# Patient Record
Sex: FEMALE | Race: BLACK OR AFRICAN AMERICAN | NOT HISPANIC OR LATINO | Employment: OTHER | URBAN - METROPOLITAN AREA
[De-identification: names, ages, dates, MRNs, and addresses within clinical notes are randomized per-mention and may not be internally consistent; named-entity substitution may affect disease eponyms.]

---

## 2017-01-18 ENCOUNTER — TELEPHONE (OUTPATIENT)
Dept: PODIATRY | Facility: CLINIC | Age: 72
End: 2017-01-18

## 2017-01-18 NOTE — TELEPHONE ENCOUNTER
----- Message from Loreta Yusuf sent at 1/18/2017 10:23 AM CST -----  Contact: self: [phone] or [phone]  Patient has an appointment on 2/7/17 but she asks if she can get in sooner because she is having a lot of pain in her foot. Please call with availability.

## 2017-01-19 PROBLEM — M10.071 ACUTE IDIOPATHIC GOUT OF RIGHT FOOT: Status: ACTIVE | Noted: 2017-01-19

## 2017-02-07 ENCOUNTER — INITIAL CONSULT (OUTPATIENT)
Dept: DERMATOLOGY | Facility: CLINIC | Age: 72
End: 2017-02-07
Payer: MEDICARE

## 2017-02-07 DIAGNOSIS — L85.3 XEROSIS CUTIS: ICD-10-CM

## 2017-02-07 DIAGNOSIS — L82.0 INFLAMED SEBORRHEIC KERATOSIS: Primary | ICD-10-CM

## 2017-02-07 PROCEDURE — 99999 PR PBB SHADOW E&M-EST. PATIENT-LVL II: CPT | Mod: PBBFAC,,, | Performed by: DERMATOLOGY

## 2017-02-07 PROCEDURE — 99202 OFFICE O/P NEW SF 15 MIN: CPT | Mod: S$PBB,,, | Performed by: DERMATOLOGY

## 2017-02-07 PROCEDURE — 99212 OFFICE O/P EST SF 10 MIN: CPT | Mod: PBBFAC,PO | Performed by: DERMATOLOGY

## 2017-02-07 NOTE — PROGRESS NOTES
Subjective:       Patient ID:  Darby Rodriguez is a 71 y.o. female who presents for   Chief Complaint   Patient presents with    Lesion     HPI Comments: Itchy lesion on right lower leg had been present for 1 month, resolved last week after using blessed oil    No phx of skin cancer  No fhx of skin cancer    C/o dry skin and wrinkling on hands, washes with hot water. Moisturizes.     Review of Systems   Skin: Negative for daily sunscreen use, activity-related sunscreen use and recent sunburn.        Objective:    Physical Exam   Skin:                 Diagram Legend     Erythematous scaling macule/papule c/w actinic keratosis       Vascular papule c/w angioma      Pigmented verrucoid papule/plaque c/w seborrheic keratosis      Yellow umbilicated papule c/w sebaceous hyperplasia      Irregularly shaped tan macule c/w lentigo     1-2 mm smooth white papules consistent with Milia      Movable subcutaneous cyst with punctum c/w epidermal inclusion cyst      Subcutaneous movable cyst c/w pilar cyst      Firm pink to brown papule c/w dermatofibroma      Pedunculated fleshy papule(s) c/w skin tag(s)      Evenly pigmented macule c/w junctional nevus     Mildly variegated pigmented, slightly irregular-bordered macule c/w mildly atypical nevus      Flesh colored to evenly pigmented papule c/w intradermal nevus       Pink pearly papule/plaque c/w basal cell carcinoma      Erythematous hyperkeratotic cursted plaque c/w SCC      Surgical scar with no sign of skin cancer recurrence      Open and closed comedones      Inflammatory papules and pustules      Verrucoid papule consistent consistent with wart     Erythematous eczematous patches and plaques     Dystrophic onycholytic nail with subungual debris c/w onychomycosis     Umbilicated papule    Erythematous-base heme-crusted tan verrucoid plaque consistent with inflamed seborrheic keratosis     Erythematous Silvery Scaling Plaque c/w Psoriasis     See annotation      Assessment  / Plan:        Inflamed seborrheic keratosis, right leg  Cryosurgery procedure note:    Verbal consent from the patient is obtained. Liquid nitrogen cryosurgery is applied to 1 lesions to produce a freeze injury. The patient is aware that blisters may form and is instructed on wound care with gentle cleansing and use of vaseline ointment to keep moist until healed. The patient is supplied a handout on cryosurgery and is instructed to call if lesions do not completely resolve. Risk of dyspigmentation discussed.       Xerosis cutis, hands  Discussed the following dry skin tips:    Avoid hot baths and showers, keep showers or baths brief (10-15 min), use a mild soap or cleanser, pat skin dry after showering and apply a moisturizer within 3 minutes of getting out of bath (cetaphil cream, ceraVe, aquaphor) and reapply moisturizers 2-4 times/day.              Return if symptoms worsen or fail to improve.

## 2017-02-22 ENCOUNTER — LAB VISIT (OUTPATIENT)
Dept: LAB | Facility: HOSPITAL | Age: 72
End: 2017-02-22
Attending: NURSE PRACTITIONER
Payer: MEDICARE

## 2017-02-22 ENCOUNTER — OFFICE VISIT (OUTPATIENT)
Dept: GASTROENTEROLOGY | Facility: CLINIC | Age: 72
End: 2017-02-22
Payer: MEDICARE

## 2017-02-22 VITALS
RESPIRATION RATE: 20 BRPM | WEIGHT: 250.69 LBS | SYSTOLIC BLOOD PRESSURE: 142 MMHG | BODY MASS INDEX: 35.1 KG/M2 | HEIGHT: 71 IN | DIASTOLIC BLOOD PRESSURE: 80 MMHG | HEART RATE: 70 BPM

## 2017-02-22 DIAGNOSIS — K64.8 INTERNAL HEMORRHOIDS WITHOUT COMPLICATION: ICD-10-CM

## 2017-02-22 DIAGNOSIS — K62.89 RECTAL PAIN: ICD-10-CM

## 2017-02-22 DIAGNOSIS — Z80.0 FAMILY HISTORY OF COLON CANCER: ICD-10-CM

## 2017-02-22 DIAGNOSIS — R10.32 LEFT LOWER QUADRANT PAIN: Primary | ICD-10-CM

## 2017-02-22 DIAGNOSIS — Z86.010 HISTORY OF COLON POLYPS: ICD-10-CM

## 2017-02-22 DIAGNOSIS — K59.09 INTERMITTENT CONSTIPATION: ICD-10-CM

## 2017-02-22 DIAGNOSIS — R10.32 LEFT LOWER QUADRANT PAIN: ICD-10-CM

## 2017-02-22 DIAGNOSIS — Z87.19 HISTORY OF IBS: ICD-10-CM

## 2017-02-22 DIAGNOSIS — Z87.19 HISTORY OF DIVERTICULOSIS: ICD-10-CM

## 2017-02-22 LAB
ALBUMIN SERPL BCP-MCNC: 3.7 G/DL
ALP SERPL-CCNC: 87 U/L
ALT SERPL W/O P-5'-P-CCNC: 16 U/L
ANION GAP SERPL CALC-SCNC: 8 MMOL/L
AST SERPL-CCNC: 39 U/L
BASOPHILS # BLD AUTO: 0 K/UL
BASOPHILS NFR BLD: 0 %
BILIRUB SERPL-MCNC: 0.5 MG/DL
BUN SERPL-MCNC: 21 MG/DL
CALCIUM SERPL-MCNC: 9.2 MG/DL
CHLORIDE SERPL-SCNC: 107 MMOL/L
CO2 SERPL-SCNC: 26 MMOL/L
CREAT SERPL-MCNC: 1.2 MG/DL
DIFFERENTIAL METHOD: ABNORMAL
EOSINOPHIL # BLD AUTO: 0 K/UL
EOSINOPHIL NFR BLD: 0.2 %
ERYTHROCYTE [DISTWIDTH] IN BLOOD BY AUTOMATED COUNT: 14.4 %
EST. GFR  (AFRICAN AMERICAN): 53.3 ML/MIN/1.73 M^2
EST. GFR  (NON AFRICAN AMERICAN): 46.2 ML/MIN/1.73 M^2
GLUCOSE SERPL-MCNC: 95 MG/DL
HCT VFR BLD AUTO: 34.8 %
HGB BLD-MCNC: 11 G/DL
LYMPHOCYTES # BLD AUTO: 0.9 K/UL
LYMPHOCYTES NFR BLD: 21.5 %
MCH RBC QN AUTO: 29.4 PG
MCHC RBC AUTO-ENTMCNC: 31.6 %
MCV RBC AUTO: 93 FL
MONOCYTES # BLD AUTO: 0.3 K/UL
MONOCYTES NFR BLD: 7.2 %
NEUTROPHILS # BLD AUTO: 3 K/UL
NEUTROPHILS NFR BLD: 70.9 %
PLATELET # BLD AUTO: 176 K/UL
PMV BLD AUTO: 10.9 FL
POTASSIUM SERPL-SCNC: 4.5 MMOL/L
PROT SERPL-MCNC: 6.9 G/DL
RBC # BLD AUTO: 3.74 M/UL
SODIUM SERPL-SCNC: 141 MMOL/L
WBC # BLD AUTO: 4.18 K/UL

## 2017-02-22 PROCEDURE — 99999 PR PBB SHADOW E&M-EST. PATIENT-LVL III: CPT | Mod: PBBFAC,,, | Performed by: NURSE PRACTITIONER

## 2017-02-22 PROCEDURE — 36415 COLL VENOUS BLD VENIPUNCTURE: CPT | Mod: PO

## 2017-02-22 PROCEDURE — 80053 COMPREHEN METABOLIC PANEL: CPT

## 2017-02-22 PROCEDURE — 99214 OFFICE O/P EST MOD 30 MIN: CPT | Mod: S$PBB,,, | Performed by: NURSE PRACTITIONER

## 2017-02-22 PROCEDURE — 85025 COMPLETE CBC W/AUTO DIFF WBC: CPT

## 2017-02-22 RX ORDER — TRAMADOL HYDROCHLORIDE 50 MG/1
50 TABLET ORAL EVERY 6 HOURS PRN
Refills: 0 | COMMUNITY
Start: 2017-02-13 | End: 2017-06-20

## 2017-02-22 RX ORDER — HYDROCORTISONE ACETATE 25 MG/1
25 SUPPOSITORY RECTAL 2 TIMES DAILY PRN
Qty: 24 SUPPOSITORY | Refills: 2 | Status: SHIPPED | OUTPATIENT
Start: 2017-02-22 | End: 2017-03-04

## 2017-02-22 RX ORDER — POLYETHYLENE GLYCOL 3350 17 G/17G
17 POWDER, FOR SOLUTION ORAL DAILY
Qty: 510 G | Refills: 2 | Status: SHIPPED | OUTPATIENT
Start: 2017-02-22 | End: 2018-08-14 | Stop reason: SDUPTHER

## 2017-02-22 RX ORDER — METHOCARBAMOL 500 MG/1
TABLET, FILM COATED ORAL
Refills: 0 | COMMUNITY
Start: 2017-02-13 | End: 2017-06-20

## 2017-02-22 NOTE — PROGRESS NOTES
Subjective:       Patient ID: Darby Rodriguez is a 71 y.o. female Body mass index is 34.96 kg/(m^2).    Chief Complaint: Abdominal Pain (for 1 month)    This patient is new to me.  Established patient of Dr. Pickett.    Abdominal Pain   This is a recurrent problem. Episode onset: started a month ago, has had similar pain in past. Episode frequency: 1-2 times a day. Duration: lasts a few seconds. The problem has been unchanged. The pain is located in the LLQ. The pain is at a severity of 7/10. The quality of the pain is sharp (soreness). The abdominal pain does not radiate. Associated symptoms include belching (not more than usual), constipation (chronic occasional; bowel movements are once every other day; increased fiber in diet or mineral oil daily), flatus (not more than usual) and nausea (mild occasional). Pertinent negatives include no anorexia, diarrhea, dysuria, fever, frequency, hematochezia, melena, vomiting or weight loss (gained weight recently). Exacerbated by: stress, bending forwarding. She has tried proton pump inhibitors and H2 blockers (bentyl 10 mg 2-3 times prn- no relief, prilosec 40 mg once daily, zantac 300 mg once daily) for the symptoms. Her past medical history is significant for GERD (controlled with zantac 300 mg once daily at night and prilosec 40 mg once daily in AM). There is no history of colon cancer, Crohn's disease, pancreatitis, PUD or ulcerative colitis.     Review of Systems   Constitutional: Negative for appetite change, chills, fatigue, fever, unexpected weight change and weight loss (gained weight recently).   HENT: Negative for sore throat and trouble swallowing.    Respiratory: Negative for cough, choking, chest tightness and shortness of breath.    Cardiovascular: Negative for chest pain and palpitations.   Gastrointestinal: Positive for abdominal pain, constipation (chronic occasional; bowel movements are once every other day; increased fiber in diet or mineral oil daily),  "flatus (not more than usual), nausea (mild occasional) and rectal pain (throbbing- constant, tried OTC hemorrhoid cream without relief; started last year; denies pain with bowel movements; denies hemorrhoids). Negative for anal bleeding, anorexia, blood in stool, diarrhea, hematochezia, melena and vomiting.   Genitourinary: Negative for difficulty urinating, dysuria, flank pain, frequency, pelvic pain and urgency.   Neurological: Negative for weakness.       Past Medical History:   Diagnosis Date    Allergy     Anemia     Arthritis     Colon polyp     Degenerative joint disease (DJD) of hip     (L); last injected 4/1/15    Diverticulosis     DJD (degenerative joint disease) of knee     left; last injected 4/1/15    GERD (gastroesophageal reflux disease)     Gout     Headache(784.0)     HTN (hypertension)     Hypothyroidism (acquired)     IBS (irritable bowel syndrome) 7/29/2013    Mixed hyperlipidemia     PONV (postoperative nausea and vomiting)     Renal insufficiency     Dr. Greene     Past Surgical History:   Procedure Laterality Date    COLONOSCOPY  2010?  [facility]    (diverticulosis?)    COLONOSCOPY  03/2015    Dr. Pickett, repeat in 5 years    COLONOSCOPY W/ POLYPECTOMY  11/12/2010  [facility]    Two 5 mm polyps in the sigmoid colon.  Diverticulosis (sigmoid, descending, and transverse colon).  Melanosis.    COLONOSCOPY W/ POLYPECTOMY  6/17/13  [facility]    One 1 mm polyp in the distal sigmoid colon.  TUBULAR ADENOMA.  Diverticulosis in the sigmoid colon.  Moderate colonic spasm consistent with IBS.  Redundant colon.  Melanosis in the colon.    ESOPHAGOGASTRODUODENOSCOPY  4/27/2012  [facility]    Normal esophagus.  Small hiatal hernia.  Antrum erythema.  REACTIVE/CHEMICAL GASTROPATHY.  NO HELICOBACTER SEEN.    ESOPHAGOSCOPY W/ DILATION  1/5/2015  [facility]    Benign-appearing esophageal stricture or "ring," dilated 54 Fr.  Reflux esophagitis. (NERD).  Hiatus hernia.  Erythematous mucosa in the antrum.  " "NONNEOPLASTIC GASTRIC MUCOSA SHOWING REACTIVE/REPARATIVE CHANGES.  CECY Test  Negative.    HYSTERECTOMY  1980    Partial    JOINT REPLACEMENT  4/15/11    (R) TKA    JOINT REPLACEMENT  8/19/09    (R)PAVAN    KNEE ARTHROSCOPY      (R) knee    RADIOFREQUENCY ABLATION Right 10/20/2014    UPPER GASTROINTESTINAL ENDOSCOPY  01/2015    Dr. Pickett     Family History   Problem Relation Age of Onset    Heart disease Sister 50     mi    Ovarian cancer Sister 53    Diabetes Mother     Colon cancer Father 65    Breast cancer Neg Hx     Crohn's disease Neg Hx     Ulcerative colitis Neg Hx      Wt Readings from Last 10 Encounters:   02/22/17 113.7 kg (250 lb 10.6 oz)   01/19/17 110.2 kg (243 lb)   12/06/16 109.3 kg (241 lb)   12/05/16 108 kg (238 lb)   11/08/16 108 kg (238 lb)   10/13/16 110.2 kg (243 lb)   08/23/16 111.7 kg (246 lb 4.1 oz)   08/17/16 109.3 kg (241 lb)   08/03/16 108.9 kg (240 lb)   07/06/16 111.1 kg (245 lb)     Lab Results   Component Value Date    TSH 0.602 12/07/2015     Reviewed prior medical records including radiology report of 8/14/14 CT abdomen/pelvis & endoscopy history (see surgical history).    3/4/15 Colonoscopy was reviewed and procedure report states:   "Impression:          - One 1 mm polyp in the cecum. Resected and                        retrieved.                       - Diverticulosis in the sigmoid colon.                       - Mild colonic spasm consistent with irritable bowel                        syndrome.                       - Diverticulosis in the descending colon, in the                        transverse colon and at the hepatic flexure.                       - Internal hemorrhoids.                       - Hemorrhagic mucosa in the proximal ascending                        colon, due to barotrauma. Biopsied.                       - The examination was otherwise normal.                       - The examined portion of the ileum was normal.  Recommendation:      - Discharge " "patient to home.                       - Await pathology results.                       - Repeat colonoscopy in 5 years for surveillance.                       - High fiber diet.                       - Use fiber, for example Citrucel, Fibercon, Konsyl                        or Metamucil.                       - Take a PROBIOTIC, such as ALIGN or CULTURELLE or                        ADAM-Q (all non-prescription), every day for a                        month.                       - Continue present medications.                       - Use Bentyl (dicyclomine) 10 mg PO QID 30 min AC.                       - Call the G.I. clinic in 2 weeks for reports (if                        you haven't heard from us sooner) [phone].                       - Return to primary care physician.                       - Return to GI clinic PRN.                       - Patient has a contact number available for                        emergencies. The signs and symptoms of potential                        delayed complications were discussed with the                        patient. Return to normal activities tomorrow.                        Written discharge instructions were provided to the                        patient.                       - Return to normal activities tomorrow.".  Biopsy results:   "1-3. FRAGMENTS OF NONNEOPLASTIC COLONIC MUCOSA WITH NO ACTIVE INFLAMMATION,  ULCERATION OR DYSPLASIA IDENTIFIED."    1/5/15 EGD was reviewed and procedure report states:   " Impression:          - Normal oropharynx.                       - Normal cricopharyngeus.                       - Benign-appearing esophageal stricture or "ring,"                        dilated. Esophagus dilated, 54 Fr.                       - Reflux esophagitis.                       - Normal esophagus otherwise.                       - Non-erosive esophageal reflux (NERD) disease.                       - Hiatus hernia.                       - Normal gastric " "fundus and gastric body.                       - Erythematous mucosa in the antrum and prepyloric                        region of the stomach. Biopsied.                       - Normal examined duodenum.  Recommendation:      - Discharge patient to home.                       - Await pathology and CLOtest results.                       - Observe patient's clinical course following                        today's procedure with therapeutic intervention.                       - Follow an antireflux regimen.                       - Continue present medications.                       - Use Prilosec (omeprazole) 40 mg PO daily.                       - Use Zantac (ranitidine) 300 mg PO daily.                       - Use sucralfate tablets 2 gram PO BID for 2 weeks.                       - Call the G.I. clinic in 2 weeks for reports (if                        you haven't heard from us sooner) [phone].                       - Return to GI clinic in 6-8 weeks. ".  Biopsy results:   "FRAGMENTS OF NONNEOPLASTIC GASTRIC MUCOSA SHOWING REACTIVE/REPARATIVE CHANGES. NO  ACUTE INFLAMMATION, EVIDENCE OF H. PYLORI OR DYSPLASIA IDENTIFIED."  Objective:      Physical Exam   Constitutional: She is oriented to person, place, and time. She appears well-developed and well-nourished. No distress.   HENT:   Mouth/Throat: Oropharynx is clear and moist and mucous membranes are normal. No oral lesions. No oropharyngeal exudate.   Eyes: Conjunctivae are normal. Pupils are equal, round, and reactive to light. No scleral icterus.   Neck: Normal range of motion. Neck supple. No tracheal deviation present.   Cardiovascular: Normal rate.    Pulmonary/Chest: Effort normal and breath sounds normal. No respiratory distress. She has no wheezes.   Abdominal: Soft. Normal appearance and bowel sounds are normal. She exhibits no distension, no abdominal bruit, no ascites and no mass. There is no hepatosplenomegaly. There is tenderness (mild) in the left " lower quadrant. There is no rigidity, no rebound, no guarding, no tenderness at McBurney's point and negative Hodgson's sign. No hernia.   Genitourinary: Rectal exam shows internal hemorrhoid (internal hemorrhoid palpated). Rectal exam shows no external hemorrhoid, no fissure, no mass, no tenderness, anal tone normal and guaiac negative stool.   Genitourinary Comments: Chaperone in room for rectal exam.   Lymphadenopathy:     She has no cervical adenopathy.   Neurological: She is alert and oriented to person, place, and time.   Skin: Skin is warm and dry. No rash noted. She is not diaphoretic. No erythema. No pallor.   Non-jaundiced   Psychiatric: She has a normal mood and affect. Her behavior is normal.   Nursing note and vitals reviewed.      Assessment:       1. Left lower quadrant pain    2. History of diverticulosis    3. History of colon polyps    4. Family history of colon cancer    5. History of IBS    6. Rectal pain    7. Intermittent constipation    8. Internal hemorrhoids without complication        Plan:       Left lower quadrant pain  -     CT Abdomen Pelvis With Contrast; Future; Expected date: 2/22/17  -     CBC auto differential; Future; Expected date: 2/22/17  -     Comprehensive metabolic panel; Future; Expected date: 2/22/17  -  START   polyethylene glycol (GLYCOLAX) 17 gram/dose powder; Take 17 g by mouth once daily.  Dispense: 510 g; Refill: 2    History of diverticulosis  -     CT Abdomen Pelvis With Contrast; Future; Expected date: 2/22/17  - discussed the diagnosis of diverticulosis and diverticulitis, to prevent diverticulitis, high fiber diet is recommended  - discussed about antibiotic treatment, patient reports she would like to wait for ct scan results to see if antibiotics are indicated  -Recommended high fiber diet after episode has completely resolved. Recommended daily exercise, adequate water intake (six 8-oz glasses of water daily), and high fiber diet. OTC fiber supplements are  recommended if diet does not reach daily fiber goal (25 grams daily), such as Metamucil, Citrucel, or FiberCon (take as directed, separate from other oral medications by >2 hours).  - Advised to avoid/minimize popcorn, corn, seeds, and nuts.    History of colon polyps & Family history of colon cancer  - next surveillance colonoscopy due 3/2020    History of IBS  -     CT Abdomen Pelvis With Contrast; Future; Expected date: 2/22/17  - discussed diagnosis with patient in depth, reviewed and gave IBS educational information in AVS, patient verbalized understanding  - recommended OTC probiotics, such as Align or Culturelle, as directed  - avoid lactose  - drink adequate water intake  -smaller, more frequent meals  -avoid trigger foods    Rectal pain & Internal hemorrhoids without complication  -     CT Abdomen Pelvis With Contrast; Future; Expected date: 2/22/17  -  START   hydrocortisone (ANUSOL-HC) 25 mg suppository; Place 1 suppository (25 mg total) rectally 2 (two) times daily as needed for Hemorrhoids.  Dispense: 24 suppository; Refill: 2  - avoid constipation and straining with bowel movements  - SITZ bath  - possible referral to general surgery/lower endoscopy if symptoms persist    Intermittent constipation  - START    polyethylene glycol (GLYCOLAX) 17 gram/dose powder; Take 17 g by mouth once daily.  Dispense: 510 g; Refill: 2  - Recommended daily exercise, adequate water intake (six 8-oz glasses of water daily), and high fiber diet. OTC fiber supplements are recommended if diet does not reach daily fiber goal (25 grams daily), such as Metamucil, Citrucel, or FiberCon (take as directed, separate from other oral medications by >2 hours).  -Recommend taking an OTC stool softener such as Colace as directed to avoid hard stools and straining with bowel movements PRN  - If no improvement, try intermittently dosed Dulcolax OTC (every 2-3 days) to facilitate bowel movements  -If still no improvement with these  measures, call/follow-up    Return in about 2 months (around 4/22/2017), or if symptoms worsen or fail to improve.    If no improvement in symptoms or symptoms worsen, call/follow-up at clinic or go to ER.

## 2017-02-22 NOTE — MR AVS SNAPSHOT
[facility]  [address]  [facility] [address]  Phone: [phone]                  Darby Rodriguez   2017 3:00 PM   Office Visit    Description:  Female : 1945   Provider:  TRINIDAD Santos   Department:  [facility]           Reason for Visit     Abdominal Pain           Diagnoses this Visit        Comments    Left lower quadrant pain    -  Primary     History of diverticulosis         History of colon polyps         Family history of colon cancer         History of IBS         Rectal pain         Intermittent constipation         Internal hemorrhoids without complication                To Do List           Future Appointments        Provider Department Dept Phone    2017 10:00 AM LAB, [facility] [phone]      Goals (5 Years of Data)     None      Follow-Up and Disposition     Return in about 2 months (around 2017), or if symptoms worsen or fail to improve.       These Medications        Disp Refills Start End    polyethylene glycol (GLYCOLAX) 17 gram/dose powder 510 g 2 2017 3/24/2017    Take 17 g by mouth once daily. - Oral    Pharmacy: [facility]/pharmacy #[id number] - [address] Ph #: [phone]       hydrocortisone (ANUSOL-HC) 25 mg suppository 24 suppository 2 2017 3/4/2017    Place 1 suppository (25 mg total) rectally 2 (two) times daily as needed for Hemorrhoids. - Rectal    Pharmacy: [facility]/pharmacy #[id number] [address] Ph #: [phone]         [facility] On [facility]     [facility] On [facility] Nurse Care Line -  Assistance  Registered nurses in the [facility] provide clinical advisement, health education, appointment booking, and other advisory services.  Call for this free service at [phone].             Medications           Message regarding Medications     Verify the  changes and/or additions to your medication regime listed below are the same as discussed with your clinician today.  If any of these changes or additions are incorrect, please notify your healthcare provider.        START taking these NEW medications        Refills    polyethylene glycol (GLYCOLAX) 17 gram/dose powder 2    Sig: Take 17 g by mouth once daily.    Class: Normal    Route: Oral    hydrocortisone (ANUSOL-HC) 25 mg suppository 2    Sig: Place 1 suppository (25 mg total) rectally 2 (two) times daily as needed for Hemorrhoids.    Class: Normal    Route: Rectal           Verify that the below list of medications is an accurate representation of the medications you are currently taking.  If none reported, the list may be blank. If incorrect, please contact your healthcare provider. Carry this list with you in case of emergency.           Current Medications     ALLERGY, CHLORPHENIRAMINE, 4 mg tablet TAKE 1 TABLET BY MOUTH EVERY FOUR TO SIX HOURS AS NEEDED.    allopurinol (ZYLOPRIM) 300 MG tablet Take 300 mg by mouth once daily.    ascorbic acid (VITAMIN C) 1000 MG tablet Take 1,000 mg by mouth once daily.     aspirin (ECOTRIN) 81 MG EC tablet Take 81 mg by mouth once a week.     atenolol (TENORMIN) 50 MG tablet Take 50 mg by mouth once daily.     atorvastatin (LIPITOR) 10 MG tablet Take one tablet by mouth once daily.    calcitRIOL (ROCALTROL) 0.25 MCG Cap Take 0.25 mcg by mouth. Monday through Friday.    calcium-vitamin D 185 MG Tab Take 185 mg by mouth once daily.      colchicine 0.6 mg tablet Take 1 tablet (0.6 mg total) by mouth 2 (two) times daily as needed.    dicyclomine (BENTYL) 10 MG capsule TAKE 1 OR 2 CAPSULES BY MOUTH UP TO FOUR TIMES DAILY USUALLY BEFORE MEALS AND AT BEDTIME TO EASE BLOATING AND GAS PAINS    ferrous sulfate 325 mg (65 mg iron) Tab Take 1 tablet by mouth. Pt takes 2 times a week    fluconazole (DIFLUCAN) 100 MG tablet TAKE 1 TABLET (100 MG TOTAL) BY MOUTH ONCE DAILY.    furosemide  "(LASIX) 40 MG tablet Take 40 mg by mouth 2 (two) times daily.    gabapentin (NEURONTIN) 600 MG tablet Take 600 mg by mouth 3 (three) times daily.     levothyroxine (SYNTHROID) 112 MCG tablet Take 1 tablet (112 mcg total) by mouth once daily.    meclizine (ANTIVERT) 25 mg tablet Take 1 tablet (25 mg total) by mouth 3 (three) times daily as needed for Dizziness or Nausea.    methocarbamol (ROBAXIN) 500 MG Tab TAKE 2 TABLETS BY MOUTH 3 TIMES DAILY FOR 10 DAYS    multivitamin (MULTIVITAMIN) per tablet Take 1 tablet by mouth once daily.      omeprazole (PRILOSEC) 40 MG capsule TAKE 1 CAPSULE (40 MG TOTAL) BY MOUTH ONCE DAILY.    oxycodone-acetaminophen (PERCOCET)  mg per tablet Take 1 tablet by mouth 3 (three) times daily as needed for Pain.    potassium chloride SA (K-DUR,KLOR-CON) 20 MEQ tablet TAKE ONE TABLET BY MOUTH TWO TIMES DAILY.    ranitidine (ZANTAC) 300 MG capsule Take 1 capsule (300 mg total) by mouth nightly. , about 1 hour before bedtime.    sertraline (ZOLOFT) 50 MG tablet Take 1 tablet (50 mg total) by mouth once daily.    tizanidine (ZANAFLEX) 4 MG tablet Take 4 mg by mouth as needed.     tramadol (ULTRAM) 50 mg tablet Take 50 mg by mouth every 6 (six) hours as needed.    valacyclovir (VALTREX) 1000 MG tablet TAKE ONE TABLET BY MOUTH THREE TIMES A DAY    hydrocortisone (ANUSOL-HC) 25 mg suppository Place 1 suppository (25 mg total) rectally 2 (two) times daily as needed for Hemorrhoids.    lorazepam (ATIVAN) 1 MG tablet Take 1 tablet (1 mg total) by mouth every 8 (eight) hours as needed for Anxiety.    polyethylene glycol (GLYCOLAX) 17 gram/dose powder Take 17 g by mouth once daily.    triamcinolone acetonide 0.1% (KENALOG) 0.1 % cream Apply topically 2 (two) times daily.           Clinical Reference Information           Your Vitals Were     BP Pulse Resp Height Weight BMI    142/80 70 20 5' 11" (1.803 m) 113.7 kg (250 lb 10.6 oz) 34.96 kg/m2      Blood Pressure          Most Recent Value    BP  " (!)  142/80      Allergies as of 2/22/2017     Penicillin G    Iodine    Meperidine    Promethazine      Immunizations Administered on Date of Encounter - 2/22/2017     None      Orders Placed During Today's Visit     Future Labs/Procedures Expected by Expires    CBC auto differential  2/22/2017 4/23/2018    Comprehensive metabolic panel  2/22/2017 4/23/2018    CT Abdomen Pelvis With Contrast  2/22/2017 2/22/2018      Instructions      Abdominal Pain    Abdominal pain is pain in the stomach or belly area. Everyone has this pain from time to time. In many cases it goes away on its own. But abdominal pain can sometimes be due to a serious problem, such as appendicitis. So its important to know when to seek help.  Causes of abdominal pain  There are many possible causes of abdominal pain. Common causes in adults include:  · Constipation, diarrhea, or gas  · Stomach acid flowing back up into the esophagus (acid reflux or heartburn)  · Severe acid reflux, called GERD (gastroesophageal reflux disease)  · A sore in the lining of the stomach or small intestine (peptic ulcer)  · Inflammation of the gallbladder, liver, or pancreas  · Gallstones or kidney stones  · Appendicitis   · Intestinal blockage   · An internal organ pushing through a muscle or other tissue (hernia)  · Urinary tract infections  · In women, menstrual cramps, fibroids, or endometriosis  · Inflammation or infection of the intestines  Diagnosing the cause of abdominal pain  Your healthcare provider will do a physical exam help find the cause of your pain. If needed, tests will be ordered. Belly pain has many possible causes. So it can be hard to find the reason for your pain. Giving details about your pain can help. Tell your provider where and when you feel the pain, and what makes it better or worse. Also let your provider know if you have other symptoms such as:  · Fever  · Tiredness  · Upset stomach (nausea)  · Vomiting  · Changes in bathroom  habits  Treating abdominal pain  Some causes of pain need emergency medical treatment right away. These include appendicitis or a bowel blockage. Other problems can be treated with rest, fluids, or medicines. Your healthcare provider can give you specific instructions for treatment or self-care based on what is causing your pain.  If you have vomiting or diarrhea, sip water or other clear fluids. When you are ready to eat solid foods again, start with small amounts of easy-to-digest, low-fat foods. These include apple sauce, toast, or crackers.   When to seek medical care  Call 911 or go to the hospital right away if you:  · Cant pass stool and are vomiting  · Are vomiting blood or have bloody diarrhea or black, tarry diarrhea  · Have chest, neck, or shoulder pain  · Feel like you might pass out  · Have pain in your shoulder blades with nausea  · Have sudden, severe belly pain  · Have new, severe pain unlike any you have felt before  · Have a belly that is rigid, hard, and tender to touch  Call your healthcare provider if you have:  · Pain for more than 5 days  · Bloating for more than 2 days  · Diarrhea for more than 5 days  · A fever of 100.4°F (38.0°C) or higher, or as directed by your provider  · Pain that gets worse  · Weight loss for no reason  · Continued lack of appetite  · Blood in your stool  How to prevent abdominal pain  Here are some tips to help prevent abdominal pain:  · Eat smaller amounts of food at one time.  · Avoid greasy, fried, or other high-fat foods.  · Avoid foods that give you gas.  · Exercise regularly.  · Drink plenty of fluids.  To help prevent GERD symptoms:  · Quit smoking.  · Reduce alcohol and certain foods that increase stomach acid.  · Avoid aspirin and over-the-counter pain and fever medicines (NSAIDS or nonsteroidal anti-inflammatory drugs), if possible  · Lose extra weight.  · Finish eating at least 2 hours before you go to bed or lie down.  · Raise the head of your bed.  Date  Last Reviewed: 7/1/2016 © 2000-2016 [employer]. [address] [place] [address], [place] [address]. All rights reserved. This information is not intended as a substitute for professional medical care. Always follow your healthcare professional's instructions.        Hemorrhoids    Hemorrhoids are swollen and inflamed veins inside the rectum and near the anus. The rectum is the last several inches of the colon. The anus is the passage between the rectum and the outside of the body.  Causes  The veins can become swollen due to increased pressure in them. This is most often caused by:  · Chronic constipation or diarrhea  · Straining when having a bowel movement  · Sitting too long on the toilet  · A low-fiber diet  · Pregnancy  Symptoms  · Bleeding from the rectum (this may be noticeable after bowel movements)  · Lump near the anus  · Itching around the anus  · Pain around the anus  There are different types of hemorrhoids. Depending on the type you have and the severity, you may be able to treat yourself at home. In some cases, a procedure may be the best treatment option. Your healthcare provider can tell you more about this, if needed.  Home care  General care  · To get relief from pain or itching, try:  ¨ Topical products. Your healthcare provider may prescribe or recommend creams, ointments, or pads that can be applied to the hemorrhoid. Use these exactly as directed.  ¨ Medicines. Your healthcare provider may recommend stool softeners, suppositories, or laxatives to help manage constipation. Use these exactly as directed.  ¨ Sitz baths. A sitz bath involves sitting in a few inches of warm bath water. Be careful not to make the water so hot that you burn yourself--test it before sitting in it. Soak for about 10 to 15 minutes a few times a day. This may help relieve pain.  Tips to help prevent hemorrhoids  · Eat more fiber. Fiber adds bulk to stool and absorbs water as it moves through your colon. This makes  stool softer and easier to pass.  ¨ Increase the fiber in your diet with more fiber-rich foods. These include fresh fruit, vegetables, and whole grains.  ¨ Take a fiber supplement or bulking agent, if advised to by your provider. These include products such as psyllium or methylcellulose.  · Drink plenty of water, if directed to by your provider. This can help keep stool soft.  · Be more active. Frequent exercise aids digestion and helps prevent constipation. It may also help make bowel movements more regular.  · Dont strain during bowel movements. This can make hemorrhoids more likely. Also, dont sit on the toilet for long periods of time.  Follow-up care  Follow up with your healthcare provider, or as advised. If a culture or imaging tests were done, you will be notified of the results when they are ready. This may take a few days or longer.  When to seek medical advice  Call your healthcare provider right away if any of these occur:  · Increased bleeding from the rectum  · Increased pain around the rectum or anus  · Weakness or dizziness  Call 911  Call 911 or return to the emergency department right away if any of these occur:  · Trouble breathing or swallowing  · Fainting or loss of consciousness  · Unusually fast heart rate  · Vomiting blood  · Large amounts of blood in stool  Date Last Reviewed: 6/22/2015 © 2000-2016 [employer]. [address] [place] [address], [place] [address]. All rights reserved. This information is not intended as a substitute for professional medical care. Always follow your healthcare professional's instructions.        Understanding Diverticulosis and Diverticulitis     Pouches or diverticula usually occur in the lower part of the colon called the sigmoid.     The colon (large intestine) is the last part of the digestive tract. It absorbs water from stool and changes it from a liquid to a solid. In certain cases, small pouches called diverticula can form in the colon wall.  This condition is called diverticulosis. The pouches can become infected. If this happens, it becomes a more serious problem called diverticulitis. These problems can be painful. But they can be managed.  Managing your condition  Diet changes or medicines may be prescribed.   If you have diverticulosis  Recommendations include:  · Diet changes are often enough to control symptoms. The main changes are adding fiber (roughage) and drinking more water. Fiber absorbs water as it travels through your colon. This helps your stool stay soft and move smoothly. Water helps this process.  · If needed, you may be told to take over-the-counter stool softeners.  · To help relieve pain, antispasmodic medicines may be prescribed.  · Watch for changes in your bowel movements. Tell the healthcare provider if you notice any changes.  · Begin an exercise program. Ask your healthcare provider how to get started.  · Get plenty of rest and sleep.   If you have diverticulitis  Treatment depends on how bad your symptoms are.  · For mild symptoms. You may be put on a liquid diet for a short time. Antibiotics are usually prescribed. If these two steps relieve your symptoms, you may then be prescribed a high-fiber diet. If you still have symptoms, your healthcare provider will discuss more treatment choices with you.  · For severe symptoms. You may need to be admitted to the hospital. There, you can be given IV antibiotics and fluids. You will also be put on a low-fiber or liquid diet. Although not common, surgery is needed in some people with severe symptoms.  [place] to colon health     Diverticulitis occurs when the pouches become infected or inflamed.     Help keep your colon healthy with a diet that includes plenty of high-fiber fruits, vegetables, and whole grains. Drink plenty of liquids like water and juice. Maintain a healthy lifestyle including regular exercise, stress management, and adequate rest and sleep.   Date Last Reviewed:  7/1/2016  © 7967-7102 [employer]. [address] [place] [address], [place] [address]. All rights reserved. This information is not intended as a substitute for professional medical care. Always follow your healthcare professional's instructions.             Language Assistance Services     ATTENTION: Language assistance services are available, free of charge. Please call [phone].      ATENCIÓN: Si habla español, tiene a ambrocio disposición servicios gratuitos de asistencia lingüística. Llame al [phone].     CHÚ Ý: N?u b?n nói Ti?ng Vi?t, có các d?ch v? h? tr? ngôn ng? mi?n phí dành cho b?n. G?i s? [phone].         [facility] complies with applicable Federal civil rights laws and does not discriminate on the basis of race, color, national origin, age, disability, or sex.

## 2017-02-22 NOTE — PATIENT INSTRUCTIONS
Abdominal Pain    Abdominal pain is pain in the stomach or belly area. Everyone has this pain from time to time. In many cases it goes away on its own. But abdominal pain can sometimes be due to a serious problem, such as appendicitis. So its important to know when to seek help.  Causes of abdominal pain  There are many possible causes of abdominal pain. Common causes in adults include:  · Constipation, diarrhea, or gas  · Stomach acid flowing back up into the esophagus (acid reflux or heartburn)  · Severe acid reflux, called GERD (gastroesophageal reflux disease)  · A sore in the lining of the stomach or small intestine (peptic ulcer)  · Inflammation of the gallbladder, liver, or pancreas  · Gallstones or kidney stones  · Appendicitis   · Intestinal blockage   · An internal organ pushing through a muscle or other tissue (hernia)  · Urinary tract infections  · In women, menstrual cramps, fibroids, or endometriosis  · Inflammation or infection of the intestines  Diagnosing the cause of abdominal pain  Your healthcare provider will do a physical exam help find the cause of your pain. If needed, tests will be ordered. Belly pain has many possible causes. So it can be hard to find the reason for your pain. Giving details about your pain can help. Tell your provider where and when you feel the pain, and what makes it better or worse. Also let your provider know if you have other symptoms such as:  · Fever  · Tiredness  · Upset stomach (nausea)  · Vomiting  · Changes in bathroom habits  Treating abdominal pain  Some causes of pain need emergency medical treatment right away. These include appendicitis or a bowel blockage. Other problems can be treated with rest, fluids, or medicines. Your healthcare provider can give you specific instructions for treatment or self-care based on what is causing your pain.  If you have vomiting or diarrhea, sip water or other clear fluids. When you are ready to eat solid foods again,  start with small amounts of easy-to-digest, low-fat foods. These include apple sauce, toast, or crackers.   When to seek medical care  Call 911 or go to the hospital right away if you:  · Cant pass stool and are vomiting  · Are vomiting blood or have bloody diarrhea or black, tarry diarrhea  · Have chest, neck, or shoulder pain  · Feel like you might pass out  · Have pain in your shoulder blades with nausea  · Have sudden, severe belly pain  · Have new, severe pain unlike any you have felt before  · Have a belly that is rigid, hard, and tender to touch  Call your healthcare provider if you have:  · Pain for more than 5 days  · Bloating for more than 2 days  · Diarrhea for more than 5 days  · A fever of 100.4°F (38.0°C) or higher, or as directed by your provider  · Pain that gets worse  · Weight loss for no reason  · Continued lack of appetite  · Blood in your stool  How to prevent abdominal pain  Here are some tips to help prevent abdominal pain:  · Eat smaller amounts of food at one time.  · Avoid greasy, fried, or other high-fat foods.  · Avoid foods that give you gas.  · Exercise regularly.  · Drink plenty of fluids.  To help prevent GERD symptoms:  · Quit smoking.  · Reduce alcohol and certain foods that increase stomach acid.  · Avoid aspirin and over-the-counter pain and fever medicines (NSAIDS or nonsteroidal anti-inflammatory drugs), if possible  · Lose extra weight.  · Finish eating at least 2 hours before you go to bed or lie down.  · Raise the head of your bed.  Date Last Reviewed: 7/1/2016  © 3139-5363 [employer]. [address] [place] [address], [place] [address]. All rights reserved. This information is not intended as a substitute for professional medical care. Always follow your healthcare professional's instructions.        Hemorrhoids    Hemorrhoids are swollen and inflamed veins inside the rectum and near the anus. The rectum is the last several inches of the colon. The anus is the passage  between the rectum and the outside of the body.  Causes  The veins can become swollen due to increased pressure in them. This is most often caused by:  · Chronic constipation or diarrhea  · Straining when having a bowel movement  · Sitting too long on the toilet  · A low-fiber diet  · Pregnancy  Symptoms  · Bleeding from the rectum (this may be noticeable after bowel movements)  · Lump near the anus  · Itching around the anus  · Pain around the anus  There are different types of hemorrhoids. Depending on the type you have and the severity, you may be able to treat yourself at home. In some cases, a procedure may be the best treatment option. Your healthcare provider can tell you more about this, if needed.  Home care  General care  · To get relief from pain or itching, try:  ¨ Topical products. Your healthcare provider may prescribe or recommend creams, ointments, or pads that can be applied to the hemorrhoid. Use these exactly as directed.  ¨ Medicines. Your healthcare provider may recommend stool softeners, suppositories, or laxatives to help manage constipation. Use these exactly as directed.  ¨ Sitz baths. A sitz bath involves sitting in a few inches of warm bath water. Be careful not to make the water so hot that you burn yourself--test it before sitting in it. Soak for about 10 to 15 minutes a few times a day. This may help relieve pain.  Tips to help prevent hemorrhoids  · Eat more fiber. Fiber adds bulk to stool and absorbs water as it moves through your colon. This makes stool softer and easier to pass.  ¨ Increase the fiber in your diet with more fiber-rich foods. These include fresh fruit, vegetables, and whole grains.  ¨ Take a fiber supplement or bulking agent, if advised to by your provider. These include products such as psyllium or methylcellulose.  · Drink plenty of water, if directed to by your provider. This can help keep stool soft.  · Be more active. Frequent exercise aids digestion and helps  prevent constipation. It may also help make bowel movements more regular.  · Dont strain during bowel movements. This can make hemorrhoids more likely. Also, dont sit on the toilet for long periods of time.  Follow-up care  Follow up with your healthcare provider, or as advised. If a culture or imaging tests were done, you will be notified of the results when they are ready. This may take a few days or longer.  When to seek medical advice  Call your healthcare provider right away if any of these occur:  · Increased bleeding from the rectum  · Increased pain around the rectum or anus  · Weakness or dizziness  Call 911  Call 911 or return to the emergency department right away if any of these occur:  · Trouble breathing or swallowing  · Fainting or loss of consciousness  · Unusually fast heart rate  · Vomiting blood  · Large amounts of blood in stool  Date Last Reviewed: 6/22/2015  © 5943-9608 [employer]. [address] [place] [address]. All rights reserved. This information is not intended as a substitute for professional medical care. Always follow your healthcare professional's instructions.        Understanding Diverticulosis and Diverticulitis     Pouches or diverticula usually occur in the lower part of the colon called the sigmoid.     The colon (large intestine) is the last part of the digestive tract. It absorbs water from stool and changes it from a liquid to a solid. In certain cases, small pouches called diverticula can form in the colon wall. This condition is called diverticulosis. The pouches can become infected. If this happens, it becomes a more serious problem called diverticulitis. These problems can be painful. But they can be managed.  Managing your condition  Diet changes or medicines may be prescribed.   If you have diverticulosis  Recommendations include:  · Diet changes are often enough to control symptoms. The main changes are adding fiber (roughage) and drinking  more water. Fiber absorbs water as it travels through your colon. This helps your stool stay soft and move smoothly. Water helps this process.  · If needed, you may be told to take over-the-counter stool softeners.  · To help relieve pain, antispasmodic medicines may be prescribed.  · Watch for changes in your bowel movements. Tell the healthcare provider if you notice any changes.  · Begin an exercise program. Ask your healthcare provider how to get started.  · Get plenty of rest and sleep.   If you have diverticulitis  Treatment depends on how bad your symptoms are.  · For mild symptoms. You may be put on a liquid diet for a short time. Antibiotics are usually prescribed. If these two steps relieve your symptoms, you may then be prescribed a high-fiber diet. If you still have symptoms, your healthcare provider will discuss more treatment choices with you.  · For severe symptoms. You may need to be admitted to the hospital. There, you can be given IV antibiotics and fluids. You will also be put on a low-fiber or liquid diet. Although not common, surgery is needed in some people with severe symptoms.  [place] to colon health     Diverticulitis occurs when the pouches become infected or inflamed.     Help keep your colon healthy with a diet that includes plenty of high-fiber fruits, vegetables, and whole grains. Drink plenty of liquids like water and juice. Maintain a healthy lifestyle including regular exercise, stress management, and adequate rest and sleep.   Date Last Reviewed: 7/1/2016  © 7049-4988 [employer]. [address] [place] [address], [place] [address]. All rights reserved. This information is not intended as a substitute for professional medical care. Always follow your healthcare professional's instructions.

## 2017-03-01 ENCOUNTER — HOSPITAL ENCOUNTER (OUTPATIENT)
Dept: RADIOLOGY | Facility: HOSPITAL | Age: 72
Discharge: HOME | End: 2017-03-01
Attending: NURSE PRACTITIONER
Payer: MEDICARE

## 2017-03-01 DIAGNOSIS — R10.32 LEFT LOWER QUADRANT PAIN: ICD-10-CM

## 2017-03-01 DIAGNOSIS — Z87.19 HISTORY OF DIVERTICULOSIS: ICD-10-CM

## 2017-03-01 DIAGNOSIS — K62.89 RECTAL PAIN: ICD-10-CM

## 2017-03-01 DIAGNOSIS — Z87.19 HISTORY OF IBS: ICD-10-CM

## 2017-03-01 PROCEDURE — 74176 CT ABD & PELVIS W/O CONTRAST: CPT | Mod: TC,PO

## 2017-03-01 PROCEDURE — 74176 CT ABD & PELVIS W/O CONTRAST: CPT | Mod: 26,,, | Performed by: RADIOLOGY

## 2017-03-01 PROCEDURE — 25500020 PHARM REV CODE 255: Mod: PO | Performed by: NURSE PRACTITIONER

## 2017-03-01 RX ADMIN — Medication 900 ML: at 12:03

## 2017-03-02 ENCOUNTER — TELEPHONE (OUTPATIENT)
Dept: GASTROENTEROLOGY | Facility: CLINIC | Age: 72
End: 2017-03-02

## 2017-03-02 DIAGNOSIS — R93.3 ABNORMAL CT SCAN, ESOPHAGUS: Primary | ICD-10-CM

## 2017-03-02 NOTE — TELEPHONE ENCOUNTER
Please call to inform & review the results with the patient-  Ct scan showed possible esophageal diverticulum or hiatal hernia (hiatal hernia seen on 1/2015 EGD). Can do esophagram to further evaluate finding.  Small umbilical hernia noted: if it becomes painful or if it does not reduce patient needs to see a general surgeon or go to the ER.  Diverticulosis without diverticulitis (no signs of infection or inflammation). Recommend high fiber diet.  degenerative changes of the spine and Other findings are unchanged since previous imaging from 8/14/2014.  Continue previous recommendations. If symptoms persist, follow-up for continued evaluation and management and to schedule colonoscopy.    Thanks,  Lani SIGALA

## 2017-03-07 ENCOUNTER — OFFICE VISIT (OUTPATIENT)
Dept: PODIATRY | Facility: CLINIC | Age: 72
End: 2017-03-07
Payer: MEDICARE

## 2017-03-07 VITALS — BODY MASS INDEX: 34.75 KG/M2 | WEIGHT: 248.25 LBS | HEIGHT: 71 IN

## 2017-03-07 DIAGNOSIS — B35.1 ONYCHOMYCOSIS DUE TO DERMATOPHYTE: ICD-10-CM

## 2017-03-07 DIAGNOSIS — I87.2 VENOUS INSUFFICIENCY: ICD-10-CM

## 2017-03-07 DIAGNOSIS — G60.9 IDIOPATHIC PERIPHERAL NEUROPATHY: Primary | ICD-10-CM

## 2017-03-07 DIAGNOSIS — S99.921A PLANTAR PLATE INJURY, RIGHT, INITIAL ENCOUNTER: ICD-10-CM

## 2017-03-07 DIAGNOSIS — L84 CORN OR CALLUS: ICD-10-CM

## 2017-03-07 PROCEDURE — 99213 OFFICE O/P EST LOW 20 MIN: CPT | Mod: S$PBB,25,,

## 2017-03-07 PROCEDURE — 11721 DEBRIDE NAIL 6 OR MORE: CPT | Mod: 59,Q9,S$PBB,

## 2017-03-07 PROCEDURE — 99214 OFFICE O/P EST MOD 30 MIN: CPT | Mod: PBBFAC,PO

## 2017-03-07 PROCEDURE — 11721 DEBRIDE NAIL 6 OR MORE: CPT | Mod: Q9,PBBFAC,PO

## 2017-03-07 PROCEDURE — 11057 PARNG/CUTG B9 HYPRKR LES >4: CPT | Mod: Q9,PBBFAC,PO

## 2017-03-07 PROCEDURE — 11057 PARNG/CUTG B9 HYPRKR LES >4: CPT | Mod: Q9,S$PBB,,

## 2017-03-07 PROCEDURE — 99999 PR PBB SHADOW E&M-EST. PATIENT-LVL IV: CPT | Mod: PBBFAC,,,

## 2017-03-07 NOTE — MR AVS SNAPSHOT
[facility]  [address] [facility] [address]  [place] [address]  Phone: [phone]                  Darby Rodriguez   3/7/2017 10:45 AM   Office Visit    Description:  Female : 1945   Provider:  Kenyon Jain DPM   Department:  [facility]           Reason for Visit     Diabetic Foot Exam     Callouses                To Do List           Future Appointments        Provider Department Dept Phone    2017 11:00 AM Kenyon Jain DPM [facility] [phone]    2017 10:00 AM LAB, [facility] [phone]      Goals (5 Years of Data)     None      [facility]     [facility] On Call Nurse Care Line -  Assistance  Registered nurses in the [facility] On Call Center provide clinical advisement, health education, appointment booking, and other advisory services.  Call for this free service at [phone].             Medications           Message regarding Medications     Verify the changes and/or additions to your medication regime listed below are the same as discussed with your clinician today.  If any of these changes or additions are incorrect, please notify your healthcare provider.             Verify that the below list of medications is an accurate representation of the medications you are currently taking.  If none reported, the list may be blank. If incorrect, please contact your healthcare provider. Carry this list with you in case of emergency.           Current Medications     ALLERGY, CHLORPHENIRAMINE, 4 mg tablet TAKE 1 TABLET BY MOUTH EVERY FOUR TO SIX HOURS AS NEEDED.    allopurinol (ZYLOPRIM) 300 MG tablet Take 300 mg by mouth once daily.    ascorbic acid (VITAMIN C) 1000 MG tablet Take 1,000 mg by mouth once daily.     aspirin (ECOTRIN) 81 MG EC tablet Take 81 mg by mouth once a week.     atenolol (TENORMIN) 50 MG tablet Take 50 mg by mouth once daily.     atorvastatin (LIPITOR) 10 MG tablet Take one tablet by mouth  once daily.    calcitRIOL (ROCALTROL) 0.25 MCG Cap Take 0.25 mcg by mouth. Monday through Friday.    calcium-vitamin D 185 MG Tab Take 185 mg by mouth once daily.      colchicine 0.6 mg tablet Take 1 tablet (0.6 mg total) by mouth 2 (two) times daily as needed.    dicyclomine (BENTYL) 10 MG capsule TAKE 1 OR 2 CAPSULES BY MOUTH UP TO FOUR TIMES DAILY USUALLY BEFORE MEALS AND AT BEDTIME TO EASE BLOATING AND GAS PAINS    ferrous sulfate 325 mg (65 mg iron) Tab Take 1 tablet by mouth. Pt takes 2 times a week    furosemide (LASIX) 40 MG tablet Take 40 mg by mouth 2 (two) times daily.    gabapentin (NEURONTIN) 600 MG tablet Take 600 mg by mouth 3 (three) times daily.     levothyroxine (SYNTHROID) 112 MCG tablet Take 1 tablet (112 mcg total) by mouth once daily.    meclizine (ANTIVERT) 25 mg tablet Take 1 tablet (25 mg total) by mouth 3 (three) times daily as needed for Dizziness or Nausea.    methocarbamol (ROBAXIN) 500 MG Tab TAKE 2 TABLETS BY MOUTH 3 TIMES DAILY FOR 10 DAYS    multivitamin (MULTIVITAMIN) per tablet Take 1 tablet by mouth once daily.      omeprazole (PRILOSEC) 40 MG capsule TAKE 1 CAPSULE (40 MG TOTAL) BY MOUTH ONCE DAILY.    oxycodone-acetaminophen (PERCOCET)  mg per tablet Take 1 tablet by mouth 3 (three) times daily as needed for Pain.    polyethylene glycol (GLYCOLAX) 17 gram/dose powder Take 17 g by mouth once daily.    potassium chloride SA (K-DUR,KLOR-CON) 20 MEQ tablet TAKE ONE TABLET BY MOUTH TWO TIMES DAILY.    ranitidine (ZANTAC) 300 MG capsule Take 1 capsule (300 mg total) by mouth nightly. , about 1 hour before bedtime.    sertraline (ZOLOFT) 50 MG tablet Take 1 tablet (50 mg total) by mouth once daily.    tizanidine (ZANAFLEX) 4 MG tablet Take 4 mg by mouth as needed.     valacyclovir (VALTREX) 1000 MG tablet TAKE ONE TABLET BY MOUTH THREE TIMES A DAY    fluconazole (DIFLUCAN) 100 MG tablet TAKE 1 TABLET (100 MG TOTAL) BY MOUTH ONCE DAILY.    lorazepam (ATIVAN) 1 MG tablet Take 1  "tablet (1 mg total) by mouth every 8 (eight) hours as needed for Anxiety.    oxycodone-acetaminophen (PERCOCET)  mg per tablet Take 1 tablet by mouth every 12 (twelve) hours as needed for Pain.    tramadol (ULTRAM) 50 mg tablet Take 50 mg by mouth every 6 (six) hours as needed.    triamcinolone acetonide 0.1% (KENALOG) 0.1 % cream Apply topically 2 (two) times daily.           Clinical Reference Information           Your Vitals Were     Height Weight BMI          5' 11" (1.803 m) 112.6 kg (248 lb 3.8 oz) 34.62 kg/m2        Allergies as of 3/7/2017     Penicillin G    Iodine    Meperidine    Promethazine      Immunizations Administered on Date of Encounter - 3/7/2017     None      Language Assistance Services     ATTENTION: Language assistance services are available, free of charge. Please call [phone].      ATENCIÓN: Si habla maria l, tiene a ambrocio disposición servicios gratuitos de asistencia lingüística. Llame al [phone].     LEXA Ý: N?u b?n nói Ti?ng Vi?t, có các d?ch v? h? tr? ngôn ng? mi?n phí angelh cho b?n. G?i s? [phone].         [facility] complies with applicable Federal civil rights laws and does not discriminate on the basis of race, color, national origin, age, disability, or sex.        "

## 2017-03-08 PROBLEM — B35.1 ONYCHOMYCOSIS DUE TO DERMATOPHYTE: Status: ACTIVE | Noted: 2017-03-08

## 2017-03-08 PROBLEM — G60.9 IDIOPATHIC PERIPHERAL NEUROPATHY: Status: ACTIVE | Noted: 2017-03-08

## 2017-03-08 PROBLEM — L84 CORN OR CALLUS: Status: ACTIVE | Noted: 2017-03-08

## 2017-03-08 PROBLEM — I87.2 VENOUS INSUFFICIENCY: Status: ACTIVE | Noted: 2017-03-08

## 2017-03-08 NOTE — PROGRESS NOTES
Chief Complaint   Patient presents with    Diabetic Foot Exam     Dr RUSTY Frost 1/19/17;  HgbA1c:  Jan 2017:  ??    Callouses     arley         History of present illness:Patient presents to clinic fot at risk footcare. She has a new complaint of right 2nd toe tenderness plantarly with dorsal contracture.She has very painful thickened yellow discolored toenails and she is having issues cutting her painful calluses. She has a history of venous insufficiency. She is also reporting burning and tingling in her toes but it is much worse lately.     ROS:  Constitution: Negative for chills, fever, weakness and malaise/fatigue.   HEENT: Negative for headaches.   Cardiovascular: Negative for chest pain and claudication.   Respiratory: Negative for cough and shortness of breath.   Musculoskeletal: Negative for muscle cramps and muscle weakness.   Gastrointestinal: Negative for nausea and vomiting.   Neurological: + for numbness and paresthesias.     Constitutional:  Patient is oriented to person, place, and time. Vital signs are normal.  Appears well-developed and well-nourished.     Vascular:  Dorsalis pedis pulses are 2+ on the right side, and 2+ on the left side.   Posterior tibial pulses are 2+ on the right side, and 2+ on the left side.   + digital hair growth, capillary fill time to all toes <3 seconds  Bilateral foot and ankle swelling worse on the right. It is pitting +2/7    Skin/Dermatological:  Skin is warm and intact.  No cyanosis or clubbing.  No rashes noted.  No open wounds.     Nails thickened, yellow discolred to base, multiple plantar met head calluses x 4 +  Toenails are thickened, yellow and discolored with subungual debris.      Musculoskeletal:      Hallux abductovalgus with reducible hammertoe deformities.  Normal range of motion of midtarsal, subtalar joints.  No restriction of ankle joint dorsiflexion or plantarflexion.  No deformity, tenderness or crepitus. No assymmetries. + lachmans's test with dorsal  contracture right 2nd toe       Neurological:  + deficits to sharp/dull, light touch or vibratory sensation.     Assessment/Plan:  Onychomycosis, callosities, venous insufficiency, peripheral neuropathy, right 2nd toe plantar plate injury: At risk patient. We discussed proper foot care. With patient permission , the toenails were debrided to the base. Patient tolerated well. The calluses were pared x4+ with a 15 blade. She tolerated well. Continue Kerydin topically at night. Splint toe in PF position, consider plantar plate repair. Return to clinic 3 months

## 2017-04-05 RX ORDER — DICYCLOMINE HYDROCHLORIDE 10 MG/1
CAPSULE ORAL
Qty: 120 CAPSULE | Refills: 10 | Status: SHIPPED | OUTPATIENT
Start: 2017-04-05 | End: 2018-05-02 | Stop reason: SDUPTHER

## 2017-04-17 ENCOUNTER — TELEPHONE (OUTPATIENT)
Dept: OTOLARYNGOLOGY | Facility: CLINIC | Age: 72
End: 2017-04-17

## 2017-04-17 NOTE — TELEPHONE ENCOUNTER
----- Message from Lara Knowles sent at 4/5/2017 11:55 AM CDT -----  Contact: self  Patient [phone] is calling for her CT scan results from 03 01 17 at [facility] in [place]/please call

## 2017-05-16 PROBLEM — G56.03 BILATERAL CARPAL TUNNEL SYNDROME: Status: ACTIVE | Noted: 2017-05-16

## 2017-06-23 ENCOUNTER — TELEPHONE (OUTPATIENT)
Dept: CARDIOLOGY | Facility: CLINIC | Age: 72
End: 2017-06-23

## 2017-06-23 NOTE — TELEPHONE ENCOUNTER
----- Message from Sarah Soliz sent at 6/23/2017  4:30 PM CDT -----  Contact: Patient  Patient called back with additional questions please call back at [phone] to advise. Thanks,

## 2017-07-05 PROBLEM — M17.12 ARTHRITIS OF LEFT KNEE: Status: ACTIVE | Noted: 2017-07-05

## 2017-07-11 ENCOUNTER — OFFICE VISIT (OUTPATIENT)
Dept: PODIATRY | Facility: CLINIC | Age: 72
End: 2017-07-11
Payer: MEDICARE

## 2017-07-11 VITALS — WEIGHT: 240.31 LBS | BODY MASS INDEX: 33.64 KG/M2 | HEIGHT: 71 IN

## 2017-07-11 DIAGNOSIS — I87.2 VENOUS INSUFFICIENCY: ICD-10-CM

## 2017-07-11 DIAGNOSIS — G60.9 IDIOPATHIC PERIPHERAL NEUROPATHY: Primary | ICD-10-CM

## 2017-07-11 DIAGNOSIS — B35.1 ONYCHOMYCOSIS DUE TO DERMATOPHYTE: ICD-10-CM

## 2017-07-11 DIAGNOSIS — L84 CORN OR CALLUS: ICD-10-CM

## 2017-07-11 PROCEDURE — 11721 DEBRIDE NAIL 6 OR MORE: CPT | Mod: 59,Q9,S$PBB,

## 2017-07-11 PROCEDURE — 11057 PARNG/CUTG B9 HYPRKR LES >4: CPT | Mod: Q9,S$PBB,,

## 2017-07-11 PROCEDURE — 99213 OFFICE O/P EST LOW 20 MIN: CPT | Mod: PBBFAC,PO

## 2017-07-11 PROCEDURE — 11721 DEBRIDE NAIL 6 OR MORE: CPT | Mod: Q9,PBBFAC,PO

## 2017-07-11 PROCEDURE — 11057 PARNG/CUTG B9 HYPRKR LES >4: CPT | Mod: Q9,PBBFAC,PO

## 2017-07-11 PROCEDURE — 99499 UNLISTED E&M SERVICE: CPT | Mod: S$PBB,,,

## 2017-07-11 PROCEDURE — 99999 PR PBB SHADOW E&M-EST. PATIENT-LVL III: CPT | Mod: PBBFAC,,,

## 2017-07-11 NOTE — PROGRESS NOTES
Chief Complaint   Patient presents with    Diabetic Foot Exam     PCP:  Dr Frost 7/5/17; HgbA1c: March 2017 ?    Callouses    Nail Care         History of present illness:Patient presents to clinic fot at risk footcare. She has a new complaint of right 2nd toe tenderness plantarly with dorsal contracture.She has very painful thickened yellow discolored toenails and she is having issues cutting her painful calluses. She has a history of venous insufficiency. She also has a history of peripheral neuropathy.     ROS:  Constitution: Negative for chills, fever, weakness and malaise/fatigue.   HEENT: Negative for headaches.   Cardiovascular: Negative for chest pain and claudication.   Respiratory: Negative for cough and shortness of breath.   Musculoskeletal: Negative for muscle cramps and muscle weakness.   Gastrointestinal: Negative for nausea and vomiting.   Neurological: + for numbness and paresthesias.     Constitutional:  Patient is oriented to person, place, and time. Vital signs are normal.  Appears well-developed and well-nourished.     Vascular:  Dorsalis pedis pulses are 2+ on the right side, and 2+ on the left side.   Posterior tibial pulses are 2+ on the right side, and 2+ on the left side.   + digital hair growth, capillary fill time to all toes <3 seconds  Bilateral foot and ankle swelling worse on the right. It is pitting +2/7    Skin/Dermatological:  Skin is warm and intact.  No cyanosis or clubbing.  No rashes noted.  No open wounds.     Nails thickened, yellow discolred to base, multiple plantar met head calluses x 4 +  Toenails are thickened, yellow and discolored with subungual debris.      Musculoskeletal:      Hallux abductovalgus with reducible hammertoe deformities.  Normal range of motion of midtarsal, subtalar joints.  No restriction of ankle joint dorsiflexion or plantarflexion.  No deformity, tenderness or crepitus. No assymmetries. + lachmans's test with dorsal contracture right 2nd toe        Neurological:  + deficits to sharp/dull, light touch or vibratory sensation.     Assessment/Plan:  Onychomycosis, callosities, venous insufficiency, peripheral neuropathy, right 2nd toe plantar plate injury: At risk patient. We discussed proper foot care. With patient permission , the toenails were debrided to the base. Patient tolerated well. The calluses were pared x4+ with a 15 blade. She tolerated well. Continue Kerydin topically at night. Splint toe in PF position, consider plantar plate repair. Return to clinic 3 months.

## 2017-09-29 ENCOUNTER — LAB VISIT (OUTPATIENT)
Dept: LAB | Facility: HOSPITAL | Age: 72
End: 2017-09-29
Attending: INTERNAL MEDICINE
Payer: MEDICARE

## 2017-09-29 DIAGNOSIS — D63.8 ANEMIA OF OTHER CHRONIC DISEASE: ICD-10-CM

## 2017-09-29 PROBLEM — D63.1 ANEMIA IN STAGE 2 CHRONIC KIDNEY DISEASE: Status: ACTIVE | Noted: 2017-09-29

## 2017-09-29 PROBLEM — N18.2 ANEMIA IN STAGE 2 CHRONIC KIDNEY DISEASE: Status: ACTIVE | Noted: 2017-09-29

## 2017-09-29 LAB
ALBUMIN SERPL BCP-MCNC: 4.2 G/DL
ALP SERPL-CCNC: 117 U/L
ALT SERPL W/O P-5'-P-CCNC: 34 U/L
ANION GAP SERPL CALC-SCNC: 9 MMOL/L
AST SERPL-CCNC: 28 U/L
BASOPHILS # BLD AUTO: 0.01 K/UL
BASOPHILS NFR BLD: 0.2 %
BILIRUB SERPL-MCNC: 0.3 MG/DL
BUN SERPL-MCNC: 25 MG/DL
CALCIUM SERPL-MCNC: 10 MG/DL
CHLORIDE SERPL-SCNC: 102 MMOL/L
CO2 SERPL-SCNC: 28 MMOL/L
CREAT SERPL-MCNC: 1.26 MG/DL
DIFFERENTIAL METHOD: ABNORMAL
EOSINOPHIL # BLD AUTO: 0 K/UL
EOSINOPHIL NFR BLD: 0.5 %
ERYTHROCYTE [DISTWIDTH] IN BLOOD BY AUTOMATED COUNT: 13.5 %
EST. GFR  (AFRICAN AMERICAN): 50 ML/MIN/1.73 M^2
EST. GFR  (NON AFRICAN AMERICAN): 43 ML/MIN/1.73 M^2
GLUCOSE SERPL-MCNC: 99 MG/DL
HCT VFR BLD AUTO: 34.7 %
HGB BLD-MCNC: 11 G/DL
LYMPHOCYTES # BLD AUTO: 0.9 K/UL
LYMPHOCYTES NFR BLD: 21.3 %
MCH RBC QN AUTO: 29.2 PG
MCHC RBC AUTO-ENTMCNC: 31.7 G/DL
MCV RBC AUTO: 92 FL
MONOCYTES # BLD AUTO: 0.4 K/UL
MONOCYTES NFR BLD: 9.9 %
NEUTROPHILS # BLD AUTO: 2.8 K/UL
NEUTROPHILS NFR BLD: 68.1 %
NRBC BLD-RTO: 0 /100 WBC
PLATELET # BLD AUTO: 163 K/UL
PMV BLD AUTO: 10.4 FL
POTASSIUM SERPL-SCNC: 5.4 MMOL/L
PROT SERPL-MCNC: 7 G/DL
RBC # BLD AUTO: 3.77 M/UL
SODIUM SERPL-SCNC: 139 MMOL/L
WBC # BLD AUTO: 4.13 K/UL

## 2017-09-29 PROCEDURE — 80053 COMPREHEN METABOLIC PANEL: CPT

## 2017-09-29 PROCEDURE — 36415 COLL VENOUS BLD VENIPUNCTURE: CPT | Mod: PN

## 2017-09-29 PROCEDURE — 85025 COMPLETE CBC W/AUTO DIFF WBC: CPT | Mod: PN

## 2017-09-29 PROCEDURE — 80053 COMPREHEN METABOLIC PANEL: CPT | Mod: PN

## 2017-09-29 PROCEDURE — 85025 COMPLETE CBC W/AUTO DIFF WBC: CPT

## 2017-10-17 ENCOUNTER — OFFICE VISIT (OUTPATIENT)
Dept: PODIATRY | Facility: CLINIC | Age: 72
End: 2017-10-17
Payer: MEDICARE

## 2017-10-17 VITALS — WEIGHT: 238.75 LBS | HEIGHT: 71 IN | BODY MASS INDEX: 33.43 KG/M2

## 2017-10-17 DIAGNOSIS — G60.9 IDIOPATHIC PERIPHERAL NEUROPATHY: Primary | ICD-10-CM

## 2017-10-17 DIAGNOSIS — M20.41 HAMMER TOES OF BOTH FEET: ICD-10-CM

## 2017-10-17 DIAGNOSIS — M20.42 HAMMER TOES OF BOTH FEET: ICD-10-CM

## 2017-10-17 DIAGNOSIS — B35.1 ONYCHOMYCOSIS DUE TO DERMATOPHYTE: ICD-10-CM

## 2017-10-17 DIAGNOSIS — I87.2 VENOUS (PERIPHERAL) INSUFFICIENCY: ICD-10-CM

## 2017-10-17 DIAGNOSIS — L84 CORN OR CALLUS: ICD-10-CM

## 2017-10-17 PROCEDURE — 99999 PR PBB SHADOW E&M-EST. PATIENT-LVL III: CPT | Mod: PBBFAC,,, | Performed by: PODIATRIST

## 2017-10-17 PROCEDURE — 11057 PARNG/CUTG B9 HYPRKR LES >4: CPT | Mod: Q9,S$PBB,, | Performed by: PODIATRIST

## 2017-10-17 PROCEDURE — 11721 DEBRIDE NAIL 6 OR MORE: CPT | Mod: PBBFAC,59,PO | Performed by: PODIATRIST

## 2017-10-17 PROCEDURE — 99499 UNLISTED E&M SERVICE: CPT | Mod: S$PBB,,, | Performed by: PODIATRIST

## 2017-10-17 PROCEDURE — 99213 OFFICE O/P EST LOW 20 MIN: CPT | Mod: PBBFAC,PO | Performed by: PODIATRIST

## 2017-10-17 PROCEDURE — 11721 DEBRIDE NAIL 6 OR MORE: CPT | Mod: 59,Q9,S$PBB, | Performed by: PODIATRIST

## 2017-10-17 PROCEDURE — 11057 PARNG/CUTG B9 HYPRKR LES >4: CPT | Mod: PBBFAC,PO | Performed by: PODIATRIST

## 2017-10-18 NOTE — PROGRESS NOTES
Subjective:      Patient ID: Darby Rodriguez is a 72 y.o. female.    Chief Complaint: Peripheral Vascular Disease ([place] 7/5/17) and Nail Problem (b/l great toe feels sore ingrown feeling)    Darby is a 72 y.o. female who presents to the clinic for evaluation and treatment of high risk feet. Darby has a past medical history of Allergy; Anemia; Arthritis; Cervical disc disease; Colon polyp; Degenerative joint disease (DJD) of hip; Diabetes mellitus type I; Diverticulosis; DJD (degenerative joint disease) of knee; Encounter for blood transfusion; GERD (gastroesophageal reflux disease); Gout; Headache(784.0); HTN (hypertension); Hypothyroidism (acquired); IBS (irritable bowel syndrome) (7/29/2013); Lumbar disc disease; Mixed hyperlipidemia; PONV (postoperative nausea and vomiting); Renal insufficiency; and Sleep apnea. The patient's chief complaint is long, thick toenails.  States that the Rt. Hallux nail plate feels as though it is significantly ingrown along both borders.  Describes pain from the nail as throbbing and rates as a 7/10.  States symptoms are exacerbated with wearing closed toe shoes.  Symptoms are alleviated with doffing of shoe gear.  Has not attempted to self treat.  Denies any additional pedal complaints.   This patient has documented high risk feet requiring routine maintenance secondary to peripheral neuropathy.    PCP: Jose Frost MD    Date Last Seen by PCP: 7/5/17    Hemoglobin A1C   Date Value Ref Range Status   08/07/2014 5.7 4.5 - 6.2 % Final       Past Medical History:   Diagnosis Date    Allergy     Anemia     Arthritis     Cervical disc disease     Colon polyp     Degenerative joint disease (DJD) of hip     (L); last injected 4/1/15    Diabetes mellitus type I     Diverticulosis     DJD (degenerative joint disease) of knee     left; last injected 4/1/15    Encounter for blood transfusion     GERD (gastroesophageal reflux disease)     Gout     Headache(784.0)     HTN  "(hypertension)     Hypothyroidism (acquired)     IBS (irritable bowel syndrome) 7/29/2013    Lumbar disc disease     Mixed hyperlipidemia     PONV (postoperative nausea and vomiting)     Renal insufficiency     Dr. Grenee    Sleep apnea        Past Surgical History:   Procedure Laterality Date    COLONOSCOPY  2010?  [facility]    (diverticulosis?)    COLONOSCOPY  03/2015    Dr. Pickett, repeat in 5 years    COLONOSCOPY W/ POLYPECTOMY  11/12/2010  [facility]    Two 5 mm polyps in the sigmoid colon.  Diverticulosis (sigmoid, descending, and transverse colon).  Melanosis.    COLONOSCOPY W/ POLYPECTOMY  6/17/13  [facility]    One 1 mm polyp in the distal sigmoid colon.  TUBULAR ADENOMA.  Diverticulosis in the sigmoid colon.  Moderate colonic spasm consistent with IBS.  Redundant colon.  Melanosis in the colon.    ESOPHAGOGASTRODUODENOSCOPY  4/27/2012  [facility]    Normal esophagus.  Small hiatal hernia.  Antrum erythema.  REACTIVE/CHEMICAL GASTROPATHY.  NO HELICOBACTER SEEN.    ESOPHAGOSCOPY W/ DILATION  1/5/2015  [facility]    Benign-appearing esophageal stricture or "ring," dilated 54 Fr.  Reflux esophagitis. (NERD).  Hiatus hernia.  Erythematous mucosa in the antrum.  NONNEOPLASTIC GASTRIC MUCOSA SHOWING REACTIVE/REPARATIVE CHANGES.  CECY Test  Negative.    HYSTERECTOMY  1980    Partial    JOINT REPLACEMENT  4/15/11    (R) TKA    JOINT REPLACEMENT  8/19/09    (R)PAVAN    KNEE ARTHROSCOPY      (R) knee    RADIOFREQUENCY ABLATION Right 10/20/2014    UPPER GASTROINTESTINAL ENDOSCOPY  01/2015    Dr. Pickett       Family History   Problem Relation Age of Onset    Heart disease Sister 50     mi    Ovarian cancer Sister 53    Diabetes Mother     Colon cancer Father 65    Breast cancer Neg Hx     Crohn's disease Neg Hx     Ulcerative colitis Neg Hx        Social History     Social History    Marital status:      Spouse name: N/A    Number of children: N/A    Years of education: N/A     Social History Main Topics    " Smoking status: Never Smoker    Smokeless tobacco: Never Used    Alcohol use No    Drug use: No    Sexual activity: Not Asked     Other Topics Concern    None     Social History Narrative    None       Current Outpatient Prescriptions   Medication Sig Dispense Refill    ALLERGY, CHLORPHENIRAMINE, 4 mg tablet TAKE 1 TABLET BY MOUTH EVERY FOUR TO SIX HOURS AS NEEDED. 30 tablet PRN    allopurinol (ZYLOPRIM) 300 MG tablet Take 300 mg by mouth once daily.      ascorbic acid (VITAMIN C) 1000 MG tablet Take 1,000 mg by mouth once daily.       aspirin (ECOTRIN) 81 MG EC tablet Take 81 mg by mouth once a week.       atenolol (TENORMIN) 50 MG tablet Take 100 mg by mouth once daily.   4    atorvastatin (LIPITOR) 10 MG tablet TAKE ONE TABLET BY MOUTH ONCE DAILY. 90 tablet 3    calcitRIOL (ROCALTROL) 0.25 MCG Cap Take 0.25 mcg by mouth. Monday through Friday.  4    calcium-vitamin D 185 MG Tab Take 185 mg by mouth once daily.        colchicine 0.6 mg tablet Take 1 tablet (0.6 mg total) by mouth 2 (two) times daily as needed. 60 tablet 5    dicyclomine (BENTYL) 10 MG capsule TAKE 1 OR 2 CAPSULES BY MOUTH UP TO FOUR TIMES DAILY USUALLY BEFORE MEALS AND AT BEDTIME TO EASE BLOATING AND GAS PAINS 120 capsule 10    ferrous sulfate 325 mg (65 mg iron) Tab Take 1 tablet by mouth. Pt takes 2 times a week      furosemide (LASIX) 80 MG tablet Take 1 tablet (80 mg total) by mouth 2 (two) times daily. (Patient taking differently: Take 40 mg by mouth every other day. ) 60 tablet 11    gabapentin (NEURONTIN) 600 MG tablet Take 1 tablet (600 mg total) by mouth 3 (three) times daily. 270 tablet 3    levothyroxine (SYNTHROID) 112 MCG tablet Take 1 tablet (112 mcg total) by mouth once daily. 90 tablet 3    lorazepam (ATIVAN) 1 MG tablet Take 1 tablet (1 mg total) by mouth every 8 (eight) hours as needed for Anxiety. 40 tablet 1    multivitamin (MULTIVITAMIN) per tablet Take 1 tablet by mouth once daily.        omeprazole  (PRILOSEC) 40 MG capsule TAKE 1 CAPSULE (40 MG TOTAL) BY MOUTH ONCE DAILY. 90 capsule 3    oxycodone-acetaminophen (PERCOCET)  mg per tablet Take 1 tablet by mouth every 12 (twelve) hours as needed for Pain. 60 tablet 0    phentermine (ADIPEX-P) 37.5 mg tablet Take 1 tablet (37.5 mg total) by mouth before breakfast. 30 tablet 0    potassium chloride SA (K-DUR,KLOR-CON) 20 MEQ tablet TAKE ONE TABLET BY MOUTH TWO TIMES DAILY. 180 tablet 12    ranitidine (ZANTAC) 300 MG capsule Take 1 capsule (300 mg total) by mouth nightly. , about 1 hour before bedtime. 30 capsule 6    sertraline (ZOLOFT) 50 MG tablet Take 1 tablet (50 mg total) by mouth once daily. 90 tablet 3    valacyclovir (VALTREX) 1000 MG tablet TAKE ONE TABLET BY MOUTH THREE TIMES A DAY 21 tablet 6     No current facility-administered medications for this visit.        Review of patient's allergies indicates:   Allergen Reactions    Penicillin g Itching    Iodine      Blurred vision on 2 occasions.    Meperidine      Other reaction(s): hyperventalating  Other reaction(s): blurred vision    Promethazine      Other reaction(s): blurred vision         Review of Systems   Constitution: Negative for chills and fever.   Cardiovascular: Positive for leg swelling. Negative for claudication.   Skin: Positive for color change, dry skin and nail changes.   Musculoskeletal: Positive for arthritis and joint swelling.   Neurological: Positive for numbness.   Psychiatric/Behavioral: Negative for altered mental status.           Objective:      Physical Exam   Constitutional: She is oriented to person, place, and time. She appears well-developed and well-nourished. No distress.   Cardiovascular:   Pulses:       Dorsalis pedis pulses are 2+ on the right side, and 2+ on the left side.        Posterior tibial pulses are 1+ on the right side, and 1+ on the left side.   CFT <3 seconds bilateral.  Pedal hair growth decreased bilateral.  Varicosities noted bilateral.   1+ pitting edema noted bilateral.  Toes are cool to touch bilateral.    Musculoskeletal: She exhibits edema and tenderness.   Muscle strength 5/5 in all muscle groups bilateral.  No tenderness nor crepitation with ROM of foot/ankle joints bilateral.  No tenderness with palpation of bilateral foot and ankle.  Bilateral pes planus foot type.  Bilateral hallux abducto valgus.  Bilateral semi-rigid contracture of toes 2-5.   Neurological: She is alert and oriented to person, place, and time. She has normal strength. A sensory deficit is present.   Protective sensation per [place]-Sanjeev monofilament intact bilateral.    Vibratory sensation decreased bilateral.    Light touch intact bilateral.   Skin: Skin is warm, dry and intact. Capillary refill takes less than 2 seconds. Lesion noted. No abrasion, no bruising, no burn, no ecchymosis, no laceration and no petechiae noted. She is not diaphoretic. No erythema.   Pedal skin appears edematous bilateral.  Toenails x 10 appear thickened by 2 mm's, elongated by 5 mm's, and discolored with subungual debris.  Incurvation noted to the medial border of the Rt. Hallux nail plate.  NO adjacent sign of infection noted.  Focal hyperkeratoses noted to bilateral medial hallux, bilateral sub 5th metatarsal head, and to the dorsal lateral aspect of the Rt. 5th toe.   Examination of the skin reveals no evidence of significant maceration, rashes, open lesions, suspicious appearing nevi or other concerning lesions.              Assessment:       Encounter Diagnoses   Name Primary?    Idiopathic peripheral neuropathy Yes    Onychomycosis due to dermatophyte     Corn or callus     Venous (peripheral) insufficiency     Hammer toes of both feet          Plan:       Darby was seen today for peripheral vascular disease and nail problem.    Diagnoses and all orders for this visit:    Idiopathic peripheral neuropathy    Onychomycosis due to dermatophyte    Corn or callus    Venous  (peripheral) insufficiency    Hammer toes of both feet      I counseled the patient on her conditions, their implications and medical management.    Advised to wear shoe gear that accommodates digital deformities.    Recommend elevation of bilateral lower extremity to address peripheral edema.    Patient instructed on proper foot hygeine. We discussed wearing proper shoe gear, daily foot inspections, never walking without protective shoe gear, never putting sharp instruments to feet    With patient's permission, nails were aggressively reduced and debrided x 10 to their soft tissue attachment mechanically and with electric , removing all offending nail and debris.  Also, a sterile #15 scalpel was used to trim lesions x 5 down to smooth appearing skin without incident.  A slant back was performed to the medial border of the Rt. Hallux nail plate without incident.  Patient relates relief following the procedure. She will continue to monitor the areas daily, inspect her feet, wear protective shoe gear when ambulatory, moisturizer to maintain skin integrity and follow in this office in approximately 2-3 months, sooner p.r.n.      Return in about 3 months (around 1/17/2018).    Wesley Mccabe DPM

## 2017-11-14 ENCOUNTER — OFFICE VISIT (OUTPATIENT)
Dept: PODIATRY | Facility: CLINIC | Age: 72
End: 2017-11-14
Payer: MEDICARE

## 2017-11-14 VITALS — HEIGHT: 71 IN | WEIGHT: 236.56 LBS | BODY MASS INDEX: 33.12 KG/M2

## 2017-11-14 DIAGNOSIS — M79.674 TOE PAIN, RIGHT: Primary | ICD-10-CM

## 2017-11-14 DIAGNOSIS — L60.0 INGROWN NAIL: ICD-10-CM

## 2017-11-14 DIAGNOSIS — G60.9 IDIOPATHIC PERIPHERAL NEUROPATHY: ICD-10-CM

## 2017-11-14 PROCEDURE — 99212 OFFICE O/P EST SF 10 MIN: CPT | Mod: PBBFAC,PO | Performed by: PODIATRIST

## 2017-11-14 PROCEDURE — 99212 OFFICE O/P EST SF 10 MIN: CPT | Mod: S$PBB,,, | Performed by: PODIATRIST

## 2017-11-14 PROCEDURE — 99999 PR PBB SHADOW E&M-EST. PATIENT-LVL II: CPT | Mod: PBBFAC,,, | Performed by: PODIATRIST

## 2017-11-14 RX ORDER — HYDROCORTISONE ACETATE 25 MG/1
25 SUPPOSITORY RECTAL 2 TIMES DAILY PRN
Refills: 2 | COMMUNITY
Start: 2017-11-04 | End: 2022-01-13 | Stop reason: SDUPTHER

## 2017-11-14 RX ORDER — TRIAMCINOLONE ACETONIDE 1 MG/G
CREAM TOPICAL 2 TIMES DAILY
Refills: 5 | COMMUNITY
Start: 2017-11-04 | End: 2020-01-31 | Stop reason: CLARIF

## 2017-11-14 NOTE — PROGRESS NOTES
Subjective:      Patient ID: Darby Rodriguez is a 72 y.o. female.    Chief Complaint: Ingrown Toenail (right great toe Dr Us 7/5/17)    Patient presents to clinic with the chief complaint of pain in the Rt. Great toe.  States symptoms are localized to the lateral nail border.  Describes pain from the nail as sharp and currently rates as an 8/10.  States symptoms are exacerbated with wearing closed toe shoes and with direct pressure to the nail.  Symptoms are alleviated with doffing of shoe gear.  Denies noting infection to the affected toe.  Denies any additional pedal complaints.       Hemoglobin A1C   Date Value Ref Range Status   08/07/2014 5.7 4.5 - 6.2 % Final       Past Medical History:   Diagnosis Date    Allergy     Anemia     Arthritis     Cervical disc disease     Colon polyp     Degenerative joint disease (DJD) of hip     (L); last injected 4/1/15    Diabetes mellitus type I     Diverticulosis     DJD (degenerative joint disease) of knee     left; last injected 4/1/15    Encounter for blood transfusion     GERD (gastroesophageal reflux disease)     Gout     Headache(784.0)     HTN (hypertension)     Hypothyroidism (acquired)     IBS (irritable bowel syndrome) 7/29/2013    Lumbar disc disease     Mixed hyperlipidemia     PONV (postoperative nausea and vomiting)     Renal insufficiency     Dr. Greene    Sleep apnea        Past Surgical History:   Procedure Laterality Date    COLONOSCOPY  2010?  [facility]    (diverticulosis?)    COLONOSCOPY  03/2015    Dr. Pickett, repeat in 5 years    COLONOSCOPY W/ POLYPECTOMY  11/12/2010  [facility]    Two 5 mm polyps in the sigmoid colon.  Diverticulosis (sigmoid, descending, and transverse colon).  Melanosis.    COLONOSCOPY W/ POLYPECTOMY  6/17/13  [facility]    One 1 mm polyp in the distal sigmoid colon.  TUBULAR ADENOMA.  Diverticulosis in the sigmoid colon.  Moderate colonic spasm consistent with IBS.  Redundant colon.  Melanosis in the colon.     "ESOPHAGOGASTRODUODENOSCOPY  4/27/2012  [facility]    Normal esophagus.  Small hiatal hernia.  Antrum erythema.  REACTIVE/CHEMICAL GASTROPATHY.  NO HELICOBACTER SEEN.    ESOPHAGOSCOPY W/ DILATION  1/5/2015  [facility]    Benign-appearing esophageal stricture or "ring," dilated 54 Fr.  Reflux esophagitis. (NERD).  Hiatus hernia.  Erythematous mucosa in the antrum.  NONNEOPLASTIC GASTRIC MUCOSA SHOWING REACTIVE/REPARATIVE CHANGES.  CECY Test  Negative.    HYSTERECTOMY  1980    Partial    JOINT REPLACEMENT  4/15/11    (R) TKA    JOINT REPLACEMENT  8/19/09    (R)PAVAN    KNEE ARTHROSCOPY      (R) knee    RADIOFREQUENCY ABLATION Right 10/20/2014    UPPER GASTROINTESTINAL ENDOSCOPY  01/2015    Dr. Pickett       Family History   Problem Relation Age of Onset    Heart disease Sister 50     mi    Ovarian cancer Sister 53    Diabetes Mother     Colon cancer Father 65    Breast cancer Neg Hx     Crohn's disease Neg Hx     Ulcerative colitis Neg Hx        Social History     Social History    Marital status:      Spouse name: N/A    Number of children: N/A    Years of education: N/A     Social History Main Topics    Smoking status: Never Smoker    Smokeless tobacco: Never Used    Alcohol use No    Drug use: No    Sexual activity: Not Asked     Other Topics Concern    None     Social History Narrative    None       Current Outpatient Prescriptions   Medication Sig Dispense Refill    ALLERGY, CHLORPHENIRAMINE, 4 mg tablet TAKE 1 TABLET BY MOUTH EVERY FOUR TO SIX HOURS AS NEEDED. 30 tablet PRN    allopurinol (ZYLOPRIM) 300 MG tablet Take 300 mg by mouth once daily.      ascorbic acid (VITAMIN C) 1000 MG tablet Take 1,000 mg by mouth once daily.       aspirin (ECOTRIN) 81 MG EC tablet Take 81 mg by mouth once a week.       atenolol (TENORMIN) 50 MG tablet Take 100 mg by mouth once daily.   4    atorvastatin (LIPITOR) 10 MG tablet TAKE ONE TABLET BY MOUTH ONCE DAILY. 90 tablet 3    benzonatate (TESSALON PERLES) " 100 MG capsule Take 1 capsule (100 mg total) by mouth 3 (three) times daily as needed for Cough. 30 capsule 1    calcitRIOL (ROCALTROL) 0.25 MCG Cap Take 0.25 mcg by mouth. Monday through Friday.  4    calcium-vitamin D 185 MG Tab Take 185 mg by mouth once daily.        colchicine 0.6 mg tablet Take 1 tablet (0.6 mg total) by mouth 2 (two) times daily as needed. 60 tablet 5    dicyclomine (BENTYL) 10 MG capsule TAKE 1 OR 2 CAPSULES BY MOUTH UP TO FOUR TIMES DAILY USUALLY BEFORE MEALS AND AT BEDTIME TO EASE BLOATING AND GAS PAINS 120 capsule 10    doxycycline (ADOXA) 50 MG tablet Take 1 tablet (50 mg total) by mouth 2 (two) times daily. 20 tablet 1    ferrous sulfate 325 mg (65 mg iron) Tab Take 1 tablet by mouth. Pt takes 2 times a week      furosemide (LASIX) 80 MG tablet Take 1 tablet (80 mg total) by mouth 2 (two) times daily. (Patient taking differently: Take 40 mg by mouth every other day. ) 60 tablet 11    gabapentin (NEURONTIN) 600 MG tablet Take 1 tablet (600 mg total) by mouth 3 (three) times daily. 270 tablet 3    hydrocortisone (ANUSOL-HC) 25 mg suppository UNWRAP AND INSERT 1 SUPPOSITORY RECTALLY 2 (TWO) TIMES DAILY AS NEEDED FOR HEMORRHOIDS.  2    levothyroxine (SYNTHROID) 112 MCG tablet Take 1 tablet (112 mcg total) by mouth once daily. 90 tablet 3    lorazepam (ATIVAN) 1 MG tablet Take 1 tablet (1 mg total) by mouth every 8 (eight) hours as needed for Anxiety. 40 tablet 1    multivitamin (MULTIVITAMIN) per tablet Take 1 tablet by mouth once daily.        omeprazole (PRILOSEC) 40 MG capsule TAKE 1 CAPSULE (40 MG TOTAL) BY MOUTH ONCE DAILY. 90 capsule 3    oxycodone-acetaminophen (PERCOCET)  mg per tablet Take 1 tablet by mouth every 12 (twelve) hours as needed for Pain. 60 tablet 0    potassium chloride SA (K-DUR,KLOR-CON) 20 MEQ tablet TAKE ONE TABLET BY MOUTH TWO TIMES DAILY. 180 tablet 12    ranitidine (ZANTAC) 300 MG capsule Take 1 capsule (300 mg total) by mouth nightly. ,  about 1 hour before bedtime. 30 capsule 6    sertraline (ZOLOFT) 50 MG tablet Take 1 tablet (50 mg total) by mouth once daily. 90 tablet 3    triamcinolone acetonide 0.1% (KENALOG) 0.1 % cream 2 (two) times daily.  5    valacyclovir (VALTREX) 1000 MG tablet TAKE ONE TABLET BY MOUTH THREE TIMES A DAY 21 tablet 6     No current facility-administered medications for this visit.        Review of patient's allergies indicates:   Allergen Reactions    Penicillin g Itching    Iodine      Blurred vision on 2 occasions.    Meperidine      Other reaction(s): hyperventalating  Other reaction(s): blurred vision    Promethazine      Other reaction(s): blurred vision         Review of Systems   Constitution: Negative for chills and fever.   Cardiovascular: Positive for leg swelling. Negative for claudication.   Skin: Positive for color change, dry skin and nail changes.   Musculoskeletal: Positive for arthritis and joint swelling.   Neurological: Positive for numbness.   Psychiatric/Behavioral: Negative for altered mental status.           Objective:      Physical Exam   Constitutional: She is oriented to person, place, and time. She appears well-developed and well-nourished. No distress.   Cardiovascular:   Pulses:       Dorsalis pedis pulses are 2+ on the right side, and 2+ on the left side.        Posterior tibial pulses are 1+ on the right side, and 1+ on the left side.   CFT <3 seconds bilateral.  Pedal hair growth decreased bilateral.  Varicosities noted bilateral.  1+ pitting edema noted bilateral.  Toes are cool to touch bilateral.    Musculoskeletal: She exhibits edema and tenderness.   Muscle strength 5/5 in all muscle groups bilateral.  No tenderness nor crepitation with ROM of foot/ankle joints bilateral.  Pain with palpation of the Rt. hallux lateral nail border.  Bilateral pes planus foot type.  Bilateral hallux abducto valgus.  Bilateral semi-rigid contracture of toes 2-5.   Neurological: She is alert and  oriented to person, place, and time. She has normal strength. A sensory deficit is present.   Protective sensation per [place]-Sanjeev monofilament intact bilateral.    Vibratory sensation decreased bilateral.    Light touch intact bilateral.   Skin: Skin is warm, dry and intact. Capillary refill takes less than 2 seconds. No abrasion, no bruising, no burn, no ecchymosis, no laceration, no lesion and no petechiae noted. She is not diaphoretic. No erythema.   Incurvation noted to both the medial and lateral border of the Rt. hallux toenail.  No adjacent sign of localized infection noted.               Assessment:       Encounter Diagnoses   Name Primary?    Idiopathic peripheral neuropathy     Toe pain, right Yes    Ingrown nail          Plan:       Darby was seen today for ingrown toenail.    Diagnoses and all orders for this visit:    Toe pain, right    Idiopathic peripheral neuropathy    Ingrown nail      I counseled the patient on her conditions, their implications and medical management.    Advised to wear shoe gear with a wider toe box to reduce rate of ingrown toenail recurrence.      With patient's permission, a sterile nail nipper was used to perform a slant back of both borders of the Rt. Hallux nail plate without incident.  Patient relates relief following the procedure. She will continue to monitor the areas daily, inspect her feet, wear protective shoe gear when ambulatory, moisturizer to maintain skin integrity and follow in this office in approximately 2-3 months, sooner p.r.n.    Return in about 3 months (around 2/14/2018).    Wesley Mccabe DPM

## 2018-03-06 ENCOUNTER — OFFICE VISIT (OUTPATIENT)
Dept: PODIATRY | Facility: CLINIC | Age: 73
End: 2018-03-06
Payer: MEDICARE

## 2018-03-06 VITALS — HEIGHT: 71 IN | BODY MASS INDEX: 32.09 KG/M2 | WEIGHT: 229.19 LBS

## 2018-03-06 DIAGNOSIS — M20.41 HAMMER TOES OF BOTH FEET: ICD-10-CM

## 2018-03-06 DIAGNOSIS — G60.9 IDIOPATHIC PERIPHERAL NEUROPATHY: Primary | ICD-10-CM

## 2018-03-06 DIAGNOSIS — M20.42 HAMMER TOES OF BOTH FEET: ICD-10-CM

## 2018-03-06 DIAGNOSIS — I87.2 VENOUS (PERIPHERAL) INSUFFICIENCY: ICD-10-CM

## 2018-03-06 DIAGNOSIS — B35.1 ONYCHOMYCOSIS DUE TO DERMATOPHYTE: ICD-10-CM

## 2018-03-06 DIAGNOSIS — L84 CORN OR CALLUS: ICD-10-CM

## 2018-03-06 DIAGNOSIS — M79.674 TOE PAIN, RIGHT: ICD-10-CM

## 2018-03-06 PROCEDURE — 11721 DEBRIDE NAIL 6 OR MORE: CPT | Mod: PBBFAC,PN | Performed by: PODIATRIST

## 2018-03-06 PROCEDURE — 11721 DEBRIDE NAIL 6 OR MORE: CPT | Mod: S$PBB,59,Q9, | Performed by: PODIATRIST

## 2018-03-06 PROCEDURE — 99213 OFFICE O/P EST LOW 20 MIN: CPT | Mod: PBBFAC,PN,25 | Performed by: PODIATRIST

## 2018-03-06 PROCEDURE — 99999 PR PBB SHADOW E&M-EST. PATIENT-LVL III: CPT | Mod: PBBFAC,,, | Performed by: PODIATRIST

## 2018-03-06 PROCEDURE — 11057 PARNG/CUTG B9 HYPRKR LES >4: CPT | Mod: PBBFAC,PN | Performed by: PODIATRIST

## 2018-03-06 PROCEDURE — 11057 PARNG/CUTG B9 HYPRKR LES >4: CPT | Mod: S$PBB,Q9,, | Performed by: PODIATRIST

## 2018-03-06 PROCEDURE — 99213 OFFICE O/P EST LOW 20 MIN: CPT | Mod: 25,S$PBB,, | Performed by: PODIATRIST

## 2018-03-07 NOTE — PROGRESS NOTES
Subjective:      Patient ID: Darby Rodriguez is a 72 y.o. female.    Chief Complaint: Toe Pain (2nd toe rt foot) and Peripheral Neuropathy ([place] 11/30/17)    Draby is a 72 y.o. female who presents to the clinic for evaluation and treatment of high risk feet. Darby has a past medical history of Allergy; Anemia; Arthritis; Atrial fibrillation; Cervical disc disease; Colon polyp; Degenerative joint disease (DJD) of hip; Diverticulosis; DJD (degenerative joint disease) of knee; Encounter for blood transfusion; GERD (gastroesophageal reflux disease); Gout; Headache(784.0); HTN (hypertension); Hypothyroidism (acquired); IBS (irritable bowel syndrome) (7/29/2013); Lumbar disc disease; Mixed hyperlipidemia; PONV (postoperative nausea and vomiting); Renal insufficiency; and Sleep apnea. The patient's chief complaint is long, thick toenails.  States that bilateral great toenails feel as though they are ingrown.  Describes sharp pain from the nails with wearing closed toe shoes and alleviated with doffing of shoe gear.  Has not attempted to self treat.  Also, relates having a painful corn along the medial aspect of the Rt. 2nd toe.  States that pressure from the adjacent great toe exacerbates symptoms.  Has attempted to apply a toe spacer with minimal improvement in symptoms.  Denies any additional pedal complaints.   This patient has documented high risk feet requiring routine maintenance secondary to peripheral neuropathy.    PCP: Jose Frost MD    Date Last Seen by PCP: 11/30/17    Hemoglobin A1C   Date Value Ref Range Status   02/09/2018 6.0 (H) 0.0 - 5.6 % Final     Comment:     Reference Interval:  5.0 - 5.6 Normal   5.7 - 6.4 High Risk   > 6.5 Diabetic    Hgb A1c results are standardized based on the (NGSP) National   Glycohemoglobin Standardization Program.    Hemoglobin A1C levels are related to mean serum/plasma glucose   during the preceding 2-3 months.        08/07/2014 5.7 4.5 - 6.2 % Final       Past Medical  "History:   Diagnosis Date    Allergy     Anemia     Arthritis     Atrial fibrillation     Cervical disc disease     Colon polyp     Degenerative joint disease (DJD) of hip     (L); last injected 4/1/15    Diverticulosis     DJD (degenerative joint disease) of knee     left; last injected 4/1/15    Encounter for blood transfusion     GERD (gastroesophageal reflux disease)     Gout     Headache(784.0)     HTN (hypertension)     Hypothyroidism (acquired)     IBS (irritable bowel syndrome) 7/29/2013    Lumbar disc disease     Mixed hyperlipidemia     PONV (postoperative nausea and vomiting)     Renal insufficiency     Dr. Greene    Sleep apnea        Past Surgical History:   Procedure Laterality Date    COLONOSCOPY  2010?  Ferro    (diverticulosis?)    COLONOSCOPY  03/2015    Dr. Pickett, repeat in 5 years    COLONOSCOPY W/ POLYPECTOMY  11/12/2010  Ferro    Two 5 mm polyps in the sigmoid colon.  Diverticulosis (sigmoid, descending, and transverse colon).  Melanosis.    COLONOSCOPY W/ POLYPECTOMY  6/17/13  [facility]    One 1 mm polyp in the distal sigmoid colon.  TUBULAR ADENOMA.  Diverticulosis in the sigmoid colon.  Moderate colonic spasm consistent with IBS.  Redundant colon.  Melanosis in the colon.    ESOPHAGOGASTRODUODENOSCOPY  4/27/2012  [facility]ro    Normal esophagus.  Small hiatal hernia.  Antrum erythema.  REACTIVE/CHEMICAL GASTROPATHY.  NO HELICOBACTER SEEN.    ESOPHAGOSCOPY W/ DILATION  1/5/2015  [facility]    Benign-appearing esophageal stricture or "ring," dilated 54 Fr.  Reflux esophagitis. (NERD).  Hiatus hernia.  Erythematous mucosa in the antrum.  NONNEOPLASTIC GASTRIC MUCOSA SHOWING REACTIVE/REPARATIVE CHANGES.  CECY Test  Negative.    HYSTERECTOMY  1980    Partial    JOINT REPLACEMENT  4/15/11    (R) TKA    JOINT REPLACEMENT  8/19/09    (R)PAVAN    KNEE ARTHROSCOPY      (R) knee    RADIOFREQUENCY ABLATION Right 10/20/2014    UPPER GASTROINTESTINAL ENDOSCOPY  01/2015    Dr. Pickett "       Family History   Problem Relation Age of Onset    Heart disease Sister 50     mi    Ovarian cancer Sister 53    Diabetes Mother     Colon cancer Father 65    Breast cancer Neg Hx     Crohn's disease Neg Hx     Ulcerative colitis Neg Hx        Social History     Social History    Marital status:      Spouse name: N/A    Number of children: N/A    Years of education: N/A     Social History Main Topics    Smoking status: Never Smoker    Smokeless tobacco: Never Used    Alcohol use No    Drug use: No    Sexual activity: Not Asked     Other Topics Concern    None     Social History Narrative    None       Current Outpatient Prescriptions   Medication Sig Dispense Refill    ALLERGY, CHLORPHENIRAMINE, 4 mg tablet TAKE 1 TABLET BY MOUTH EVERY FOUR TO SIX HOURS AS NEEDED. 30 tablet PRN    allopurinol (ZYLOPRIM) 300 MG tablet Take 300 mg by mouth once daily.      ascorbic acid (VITAMIN C) 1000 MG tablet Take 1,000 mg by mouth once daily.       aspirin (ECOTRIN) 81 MG EC tablet Take 81 mg by mouth once a week.       atenolol (TENORMIN) 100 MG tablet Take 100 mg by mouth once daily.      atorvastatin (LIPITOR) 10 MG tablet TAKE ONE TABLET BY MOUTH ONCE DAILY. 90 tablet 3    azithromycin (Z-TANVIR) 250 MG tablet t2 tabs on day one then t1 tab day 2-5 6 tablet 0    calcitRIOL (ROCALTROL) 0.25 MCG Cap Take 0.25 mcg by mouth. Monday through Friday.  4    calcium-vitamin D 185 MG Tab Take 185 mg by mouth once daily.        colchicine 0.6 mg tablet Take 1 tablet (0.6 mg total) by mouth 2 (two) times daily as needed. 60 tablet 5    dicyclomine (BENTYL) 10 MG capsule TAKE 1 OR 2 CAPSULES BY MOUTH UP TO FOUR TIMES DAILY USUALLY BEFORE MEALS AND AT BEDTIME TO EASE BLOATING AND GAS PAINS 120 capsule 10    DULoxetine (CYMBALTA) 30 MG capsule Take 1 capsule (30 mg total) by mouth once daily. 30 capsule 11    ferrous sulfate 325 mg (65 mg iron) Tab Take 1 tablet by mouth. Pt takes 2 times a week       furosemide (LASIX) 80 MG tablet Take 1 tablet (80 mg total) by mouth 2 (two) times daily. (Patient taking differently: Take 40 mg by mouth every other day. ) 60 tablet 11    gabapentin (NEURONTIN) 600 MG tablet Take 1 tablet (600 mg total) by mouth 3 (three) times daily. 270 tablet 3    hydrocortisone (ANUSOL-HC) 25 mg suppository UNWRAP AND INSERT 1 SUPPOSITORY RECTALLY 2 (TWO) TIMES DAILY AS NEEDED FOR HEMORRHOIDS.  2    levothyroxine (SYNTHROID) 112 MCG tablet Take 1 tablet (112 mcg total) by mouth once daily. 90 tablet 3    multivitamin (MULTIVITAMIN) per tablet Take 1 tablet by mouth once daily.        omeprazole (PRILOSEC) 40 MG capsule TAKE 1 CAPSULE (40 MG TOTAL) BY MOUTH ONCE DAILY. 90 capsule 3    oxyCODONE-acetaminophen (PERCOCET)  mg per tablet Take 1 tablet by mouth every 12 (twelve) hours as needed for Pain. 60 tablet 0    potassium chloride SA (K-DUR,KLOR-CON) 20 MEQ tablet TAKE ONE TABLET BY MOUTH TWO TIMES DAILY. 180 tablet 13    ranitidine (ZANTAC) 300 MG capsule Take 1 capsule (300 mg total) by mouth nightly. , about 1 hour before bedtime. 30 capsule 6    triamcinolone acetonide 0.1% (KENALOG) 0.1 % cream 2 (two) times daily.  5    valacyclovir (VALTREX) 1000 MG tablet TAKE ONE TABLET BY MOUTH THREE TIMES A DAY 21 tablet 6     No current facility-administered medications for this visit.        Review of patient's allergies indicates:   Allergen Reactions    Penicillin g Itching    Iodine      Blurred vision on 2 occasions.    Meperidine      Other reaction(s): hyperventalating  Other reaction(s): blurred vision    Promethazine      Other reaction(s): blurred vision         Review of Systems   Constitution: Negative for chills and fever.   Cardiovascular: Positive for leg swelling. Negative for claudication.   Skin: Positive for color change, dry skin and nail changes.   Musculoskeletal: Positive for arthritis and joint swelling.   Neurological: Positive for numbness.    Psychiatric/Behavioral: Negative for altered mental status.           Objective:      Physical Exam   Constitutional: She is oriented to person, place, and time. She appears well-developed and well-nourished. No distress.   Cardiovascular:   Pulses:       Dorsalis pedis pulses are 2+ on the right side, and 2+ on the left side.        Posterior tibial pulses are 1+ on the right side, and 1+ on the left side.   CFT <3 seconds bilateral.  Pedal hair growth decreased bilateral.  Varicosities noted bilateral.  1+ pitting edema noted bilateral.  Toes are cool to touch bilateral.    Musculoskeletal: She exhibits edema and tenderness.   Muscle strength 5/5 in all muscle groups bilateral.  No tenderness nor crepitation with ROM of foot/ankle joints bilateral.  Pain with palpation to the callus of the Rt. 2nd toe and to both borders of bilateral hallux toenail.  Bilateral pes planus foot type.  Bilateral hallux abducto valgus.  Bilateral semi-rigid contracture of toes 2-5.   Neurological: She is alert and oriented to person, place, and time. She has normal strength. A sensory deficit is present.   Protective sensation per [place] monofilament intact bilateral.    Vibratory sensation decreased bilateral.    Light touch intact bilateral.   Skin: Skin is warm, dry and intact. Capillary refill takes less than 2 seconds. Lesion noted. No abrasion, no bruising, no burn, no ecchymosis, no laceration and no petechiae noted. She is not diaphoretic. No erythema.   Pedal skin appears edematous bilateral.  Toenails x 10 appear thickened by 2 mm's, elongated by 3 mm's, and discolored with subungual debris.  Incurvation noted to the both borders of bilateral hallux toenail.  No adjacent sign of infection noted.  Focal hyperkeratoses noted to bilateral medial hallux, bilateral sub 5th metatarsal head, and to the dorsal lateral aspect of the Rt. 5th toe.  Also, a new lesion noted to the medial aspect of the Rt. 2nd toe.    Examination of the skin reveals no evidence of significant maceration, rashes, open lesions, suspicious appearing nevi or other concerning lesions.              Assessment:       Encounter Diagnoses   Name Primary?    Idiopathic peripheral neuropathy Yes    Toe pain, right     Corn or callus     Onychomycosis due to dermatophyte     Venous (peripheral) insufficiency     Hammer toes of both feet          Plan:       Darby was seen today for toe pain and peripheral neuropathy.    Diagnoses and all orders for this visit:    Idiopathic peripheral neuropathy    Toe pain, right    Corn or callus    Onychomycosis due to dermatophyte    Venous (peripheral) insufficiency    Hammer toes of both feet      I counseled the patient on her conditions, their implications and medical management.    Advised to wear shoe gear that accommodates digital deformities.    Recommend elevation of bilateral lower extremity to address peripheral edema.    Patient instructed on proper foot hygeine. We discussed wearing proper shoe gear, daily foot inspections, never walking without protective shoe gear, never putting sharp instruments to feet    Fitted and dispensed a toe sleeve to offload the Rt. 2nd toe.    With patient's permission, nails were aggressively reduced and debrided x 10 to their soft tissue attachment mechanically and with electric , removing all offending nail and debris.  Also, a sterile #15 scalpel was used to trim lesions x 6 down to smooth appearing skin without incident.  A slant back was performed to both borders of bilateral hallux nail plate without incident.  Patient relates relief following the procedure. She will continue to monitor the areas daily, inspect her feet, wear protective shoe gear when ambulatory, moisturizer to maintain skin integrity and follow in this office in approximately 2-3 months, sooner p.r.n.      Follow-up in about 3 months (around 6/6/2018).    Wesley Mccabe DPM

## 2018-04-25 ENCOUNTER — TELEPHONE (OUTPATIENT)
Dept: CARDIOLOGY | Facility: CLINIC | Age: 73
End: 2018-04-25

## 2018-04-25 NOTE — TELEPHONE ENCOUNTER
----- Message from Yoli Jo sent at 4/25/2018  9:08 AM CDT -----  Contact: Mimi patrick/[facility] [phone]  They faxed a request for clearance, have you received it?  Thank you!

## 2018-06-08 ENCOUNTER — TELEPHONE (OUTPATIENT)
Dept: PODIATRY | Facility: CLINIC | Age: 73
End: 2018-06-08

## 2018-06-08 NOTE — TELEPHONE ENCOUNTER
RN returned call to Radha Rodriguez. Appointment rescheduled to Monday June 11th at 240 pm and spouse appointment rescheduled to 3:20. Approval given by Dr. Mccabe.

## 2018-06-08 NOTE — TELEPHONE ENCOUNTER
----- Message from Mouna Flores sent at 6/8/2018  8:04 AM CDT -----  Contact: PT  Type:  Same Day Appointment Request    Caller is requesting a same day appointment.  Caller declined first available appointment listed below.      Name of Caller:  Darby Rodriguez  When is the first available appointment?  Monday 6/11/18  Symptoms:  3 mo F/U, toe is discolored/ painful  Best Call Back Number:    Additional Information:   PT missed her 8am appt and would still like to come in today. Please call back and advise.

## 2018-06-19 ENCOUNTER — OFFICE VISIT (OUTPATIENT)
Dept: PODIATRY | Facility: CLINIC | Age: 73
End: 2018-06-19
Payer: MEDICARE

## 2018-06-19 VITALS — BODY MASS INDEX: 32.07 KG/M2 | WEIGHT: 229.06 LBS | HEIGHT: 71 IN | RESPIRATION RATE: 14 BRPM

## 2018-06-19 DIAGNOSIS — L84 CORN OR CALLUS: ICD-10-CM

## 2018-06-19 DIAGNOSIS — M20.11 VALGUS DEFORMITY OF BOTH GREAT TOES: ICD-10-CM

## 2018-06-19 DIAGNOSIS — G60.9 IDIOPATHIC PERIPHERAL NEUROPATHY: Primary | ICD-10-CM

## 2018-06-19 DIAGNOSIS — M20.41 HAMMER TOES OF BOTH FEET: ICD-10-CM

## 2018-06-19 DIAGNOSIS — B35.1 ONYCHOMYCOSIS DUE TO DERMATOPHYTE: ICD-10-CM

## 2018-06-19 DIAGNOSIS — M20.12 VALGUS DEFORMITY OF BOTH GREAT TOES: ICD-10-CM

## 2018-06-19 DIAGNOSIS — M20.42 HAMMER TOES OF BOTH FEET: ICD-10-CM

## 2018-06-19 DIAGNOSIS — I87.2 VENOUS (PERIPHERAL) INSUFFICIENCY: ICD-10-CM

## 2018-06-19 PROCEDURE — 11721 DEBRIDE NAIL 6 OR MORE: CPT | Mod: S$PBB,59,Q9, | Performed by: PODIATRIST

## 2018-06-19 PROCEDURE — 11057 PARNG/CUTG B9 HYPRKR LES >4: CPT | Mod: PBBFAC,PN | Performed by: PODIATRIST

## 2018-06-19 PROCEDURE — 99213 OFFICE O/P EST LOW 20 MIN: CPT | Mod: PBBFAC,PN,25 | Performed by: PODIATRIST

## 2018-06-19 PROCEDURE — 11721 DEBRIDE NAIL 6 OR MORE: CPT | Mod: PBBFAC,PN | Performed by: PODIATRIST

## 2018-06-19 PROCEDURE — 99499 UNLISTED E&M SERVICE: CPT | Mod: S$PBB,,, | Performed by: PODIATRIST

## 2018-06-19 PROCEDURE — 99999 PR PBB SHADOW E&M-EST. PATIENT-LVL III: CPT | Mod: PBBFAC,,, | Performed by: PODIATRIST

## 2018-06-19 PROCEDURE — 11057 PARNG/CUTG B9 HYPRKR LES >4: CPT | Mod: S$PBB,Q9,, | Performed by: PODIATRIST

## 2018-06-20 NOTE — PROGRESS NOTES
Subjective:      Patient ID: Darby Rodriguez is a 73 y.o. female.    Chief Complaint: Callouses and Nail Care    Darby is a 73 y.o. female who presents to the clinic for evaluation and treatment of high risk feet. Darby has a past medical history of Allergy; Anemia; Arthritis; Atrial fibrillation; Cervical disc disease; Colon polyp; Degenerative joint disease (DJD) of hip; Diverticulosis; DJD (degenerative joint disease) of knee; Encounter for blood transfusion; GERD (gastroesophageal reflux disease); Gout; Headache(784.0); HTN (hypertension); Hypothyroidism (acquired); IBS (irritable bowel syndrome) (7/29/2013); Lumbar disc disease; Mixed hyperlipidemia; PONV (postoperative nausea and vomiting); Renal insufficiency; and Sleep apnea. The patient's chief complaint is toenails in need of trimming.  Denies being painful with wearing shoe gear.  Has not attempted to self treat.  Also, relates having continued callus build up.  Has recently noted progression in the Rt. 2nd toe contracture and bunion deformity.  Inquires as to the future progression of the involved digits.   Denies any additional pedal complaints.   This patient has documented high risk feet requiring routine maintenance secondary to peripheral neuropathy.    PCP: Jose Frost MD      Hemoglobin A1C   Date Value Ref Range Status   02/09/2018 6.0 (H) 0.0 - 5.6 % Final     Comment:     Reference Interval:  5.0 - 5.6 Normal   5.7 - 6.4 High Risk   > 6.5 Diabetic    Hgb A1c results are standardized based on the (NGSP) National   Glycohemoglobin Standardization Program.    Hemoglobin A1C levels are related to mean serum/plasma glucose   during the preceding 2-3 months.        08/07/2014 5.7 4.5 - 6.2 % Final       Past Medical History:   Diagnosis Date    Allergy     Anemia     Arthritis     Atrial fibrillation     Cervical disc disease     Colon polyp     Degenerative joint disease (DJD) of hip     (L); last injected 4/1/15    Diverticulosis     DJD  "(degenerative joint disease) of knee     left; last injected 4/1/15    Encounter for blood transfusion     GERD (gastroesophageal reflux disease)     Gout     Headache(784.0)     HTN (hypertension)     Hypothyroidism (acquired)     IBS (irritable bowel syndrome) 7/29/2013    Lumbar disc disease     Mixed hyperlipidemia     PONV (postoperative nausea and vomiting)     Renal insufficiency     Dr. Greene    Sleep apnea        Past Surgical History:   Procedure Laterality Date    COLONOSCOPY  2010?  Ferro    (diverticulosis?)    COLONOSCOPY  03/2015    Dr. Pickett, repeat in 5 years    COLONOSCOPY W/ POLYPECTOMY  11/12/2010  Ferro    Two 5 mm polyps in the sigmoid colon.  Diverticulosis (sigmoid, descending, and transverse colon).  Melanosis.    COLONOSCOPY W/ POLYPECTOMY  6/17/13  [facility]    One 1 mm polyp in the distal sigmoid colon.  TUBULAR ADENOMA.  Diverticulosis in the sigmoid colon.  Moderate colonic spasm consistent with IBS.  Redundant colon.  Melanosis in the colon.    ESOPHAGOGASTRODUODENOSCOPY  4/27/2012  [facility]ro    Normal esophagus.  Small hiatal hernia.  Antrum erythema.  REACTIVE/CHEMICAL GASTROPATHY.  NO HELICOBACTER SEEN.    ESOPHAGOSCOPY W/ DILATION  1/5/2015  [facility]    Benign-appearing esophageal stricture or "ring," dilated 54 Fr.  Reflux esophagitis. (NERD).  Hiatus hernia.  Erythematous mucosa in the antrum.  NONNEOPLASTIC GASTRIC MUCOSA SHOWING REACTIVE/REPARATIVE CHANGES.  CECY Test  Negative.    HYSTERECTOMY  1980    Partial    JOINT REPLACEMENT  4/15/11    (R) TKA    JOINT REPLACEMENT  8/19/09    (R)PAVAN    KNEE ARTHROSCOPY      (R) knee    RADIOFREQUENCY ABLATION Right 10/20/2014    UPPER GASTROINTESTINAL ENDOSCOPY  01/2015    Dr. Pickett       Family History   Problem Relation Age of Onset    Heart disease Sister 50        mi    Ovarian cancer Sister 53    Diabetes Mother     Colon cancer Father 65    Breast cancer Neg Hx     Crohn's disease Neg Hx     Ulcerative colitis " Neg Hx        Social History     Social History    Marital status:      Spouse name: N/A    Number of children: N/A    Years of education: N/A     Social History Main Topics    Smoking status: Never Smoker    Smokeless tobacco: Never Used    Alcohol use No    Drug use: No    Sexual activity: Not Asked     Other Topics Concern    None     Social History Narrative    None       Current Outpatient Prescriptions   Medication Sig Dispense Refill    ALLERGY, CHLORPHENIRAMINE, 4 mg tablet TAKE 1 TABLET BY MOUTH EVERY FOUR TO SIX HOURS AS NEEDED. 30 tablet PRN    allopurinol (ZYLOPRIM) 300 MG tablet Take 1 tablet (300 mg total) by mouth once daily. 90 tablet 3    ascorbic acid (VITAMIN C) 1000 MG tablet Take 1,000 mg by mouth once daily.       aspirin (ECOTRIN) 81 MG EC tablet Take 81 mg by mouth once a week.       atenolol (TENORMIN) 100 MG tablet Take 100 mg by mouth once daily.      atorvastatin (LIPITOR) 10 MG tablet TAKE ONE TABLET BY MOUTH ONCE DAILY. 90 tablet 3    calcitRIOL (ROCALTROL) 0.25 MCG Cap Take 0.25 mcg by mouth. Monday through Friday.  4    calcium-vitamin D 185 MG Tab Take 185 mg by mouth once daily.        colchicine 0.6 mg tablet Take 1 tablet (0.6 mg total) by mouth 2 (two) times daily as needed. 60 tablet 5    diazePAM (VALIUM) 10 MG Tab Take one hour prior to MRI 3 tablet 0    dicyclomine (BENTYL) 10 MG capsule TAKE 1 OR 2 CAPSULES BY MOUTH UP TO FOUR TIMES DAILY USUALLY BEFORE MEALS AND AT BEDTIME TO EASE BLOATING AND GAS PAINS 120 capsule 11    diltiaZEM (CARDIZEM CD) 180 MG 24 hr capsule Take 180 mg by mouth nightly.  3    DULoxetine (CYMBALTA) 30 MG capsule Take 1 capsule (30 mg total) by mouth once daily. 30 capsule 11    ferrous sulfate 325 mg (65 mg iron) Tab Take 1 tablet by mouth. Pt takes 2 times a week      furosemide (LASIX) 80 MG tablet TAKE 1 TABLET (80 MG TOTAL) BY MOUTH 2 (TWO) TIMES DAILY. 60 tablet 11    gabapentin (NEURONTIN) 600 MG tablet Take 1  tablet (600 mg total) by mouth 3 (three) times daily. 270 tablet 3    gabapentin (NEURONTIN) 600 MG tablet TAKE 1 TABLET (600 MG TOTAL) BY MOUTH 3 (THREE) TIMES DAILY. 270 tablet 2    hydrocortisone (ANUSOL-HC) 25 mg suppository UNWRAP AND INSERT 1 SUPPOSITORY RECTALLY 2 (TWO) TIMES DAILY AS NEEDED FOR HEMORRHOIDS.  2    levothyroxine (SYNTHROID) 112 MCG tablet Take 1 tablet (112 mcg total) by mouth once daily. 90 tablet 3    levothyroxine (SYNTHROID) 112 MCG tablet TAKE ONE TABLET BY MOUTH ONCE DAILY. 30 tablet 12    meclizine (ANTIVERT) 25 mg tablet Take 1 tablet (25 mg total) by mouth 3 (three) times daily as needed. 40 tablet 2    multivitamin (MULTIVITAMIN) per tablet Take 1 tablet by mouth once daily.        omeprazole (PRILOSEC) 40 MG capsule TAKE 1 CAPSULE (40 MG TOTAL) BY MOUTH ONCE DAILY. 90 capsule 3    oxyCODONE-acetaminophen (PERCOCET)  mg per tablet Take 1 tablet by mouth every 12 (twelve) hours as needed for Pain. 60 tablet 0    potassium chloride SA (K-DUR,KLOR-CON) 20 MEQ tablet TAKE ONE TABLET BY MOUTH TWO TIMES DAILY. 180 tablet 13    ranitidine (ZANTAC) 300 MG capsule Take 1 capsule (300 mg total) by mouth nightly. , about 1 hour before bedtime. 30 capsule 6    triamcinolone acetonide 0.1% (KENALOG) 0.1 % cream 2 (two) times daily.  5    valacyclovir (VALTREX) 1000 MG tablet TAKE ONE TABLET BY MOUTH THREE TIMES A DAY 21 tablet 6     No current facility-administered medications for this visit.        Review of patient's allergies indicates:   Allergen Reactions    Penicillin g Itching    Iodine      Blurred vision on 2 occasions.    Meperidine      Other reaction(s): hyperventalating  Other reaction(s): blurred vision    Promethazine      Other reaction(s): blurred vision         Review of Systems   Constitution: Negative for chills and fever.   Cardiovascular: Positive for leg swelling. Negative for claudication.   Skin: Positive for color change, dry skin and nail changes.    Musculoskeletal: Positive for arthritis and joint swelling.   Neurological: Positive for numbness.   Psychiatric/Behavioral: Negative for altered mental status.           Objective:      Physical Exam   Constitutional: She is oriented to person, place, and time. She appears well-developed and well-nourished. No distress.   Cardiovascular:   Pulses:       Dorsalis pedis pulses are 2+ on the right side, and 2+ on the left side.        Posterior tibial pulses are 1+ on the right side, and 1+ on the left side.   CFT <3 seconds bilateral.  Pedal hair growth decreased bilateral.  Varicosities noted bilateral.  1+ pitting edema noted bilateral.  Toes are cool to touch bilateral.    Musculoskeletal: She exhibits edema and tenderness.   Muscle strength 5/5 in all muscle groups bilateral.  No tenderness nor crepitation with ROM of foot/ankle joints bilateral.  Pain with palpation to the callus of the Rt. 2nd toe and to both borders of bilateral hallux toenail.  Bilateral pes planus foot type.  Bilateral hallux abducto valgus.  Bilateral semi-rigid contracture of toes 2-5.   Neurological: She is alert and oriented to person, place, and time. She has normal strength. A sensory deficit is present.   Protective sensation per [place]-Sanjeev monofilament intact bilateral.    Vibratory sensation decreased bilateral.    Light touch intact bilateral.   Skin: Skin is warm, dry and intact. Capillary refill takes less than 2 seconds. Lesion noted. No abrasion, no bruising, no burn, no ecchymosis, no laceration and no petechiae noted. She is not diaphoretic. No erythema.   Pedal skin appears edematous bilateral.  Toenails x 10 appear thickened by 2 mm's, elongated by 2 mm's, and discolored with subungual debris.  Focal hyperkeratoses noted to the Rt. Medial 2nd toe, bilateral medial hallux, bilateral sub 5th metatarsal head, and to the dorsal lateral aspect of the Rt. 5th toe.  Examination of the skin reveals no evidence of  significant maceration, rashes, open lesions, suspicious appearing nevi or other concerning lesions.              Assessment:       Encounter Diagnoses   Name Primary?    Idiopathic peripheral neuropathy Yes    Corn or callus     Onychomycosis due to dermatophyte     Hammer toes of both feet     Valgus deformity of both great toes     Venous (peripheral) insufficiency          Plan:       Darby was seen today for callouses and nail care.    Diagnoses and all orders for this visit:    Idiopathic peripheral neuropathy    Corn or callus    Onychomycosis due to dermatophyte    Hammer toes of both feet    Valgus deformity of both great toes    Venous (peripheral) insufficiency      I counseled the patient on her conditions, their implications and medical management.    Explained that it's next to impossible to determine the progression of bunion and hammertoe deformities.  Advised to monitor the toes for excessive pressure to the joints while wearing shoe gear.       Advised to wear shoe gear that accommodates digital deformities.    Recommend elevation of bilateral lower extremity to address peripheral edema.    Patient instructed on proper foot hygeine. We discussed wearing proper shoe gear, daily foot inspections, never walking without protective shoe gear, never putting sharp instruments to feet    Fitted and dispensed a new toe sleeve to offload the Rt. 2nd toe.    With patient's permission, nails were aggressively reduced and debrided x 10 to their soft tissue attachment mechanically and with electric , removing all offending nail and debris.  Also, a sterile #15 scalpel was used to trim lesions x 6 down to smooth appearing skin without incident.  Patient relates relief following the procedure. She will continue to monitor the areas daily, inspect her feet, wear protective shoe gear when ambulatory, moisturizer to maintain skin integrity and follow in this office in approximately 2-3 months, sooner  p.r.n.      Follow-up in about 3 months (around 9/19/2018).    Wesley Mccabe DPM

## 2018-07-06 ENCOUNTER — TELEPHONE (OUTPATIENT)
Dept: CARDIOLOGY | Facility: CLINIC | Age: 73
End: 2018-07-06

## 2018-07-06 NOTE — TELEPHONE ENCOUNTER
----- Message from Marj Obrien sent at 7/6/2018  1:04 PM CDT -----  Contact: Mimi Valverde on the status of a surgery clearance they have faxed over several times on the pt.Please call them today at [phone]

## 2018-07-06 NOTE — TELEPHONE ENCOUNTER
Called Mimi Bateman's office she picked up and put me on hold for over 10 minutes phone began to rang back to front line still no answer.

## 2018-07-26 ENCOUNTER — TELEPHONE (OUTPATIENT)
Dept: CARDIOLOGY | Facility: CLINIC | Age: 73
End: 2018-07-26

## 2018-07-26 NOTE — TELEPHONE ENCOUNTER
----- Message from Carroll Pierre sent at 7/26/2018 10:28 AM CDT -----  Contact: Sonali patrick/Dr Nicolle Rizvi is calling to check the status of pt's surgery clearance form, faxed the form over on June 28 and still has not heard anything back, pls call back and advise   Call Back#[phone]  Thanks

## 2018-08-14 ENCOUNTER — TELEPHONE (OUTPATIENT)
Dept: GASTROENTEROLOGY | Facility: CLINIC | Age: 73
End: 2018-08-14

## 2018-08-14 DIAGNOSIS — K59.09 INTERMITTENT CONSTIPATION: ICD-10-CM

## 2018-08-14 DIAGNOSIS — R10.32 LEFT LOWER QUADRANT PAIN: ICD-10-CM

## 2018-08-15 RX ORDER — POLYETHYLENE GLYCOL 3350 17 G/17G
POWDER, FOR SOLUTION ORAL
Qty: 527 G | Refills: 1 | Status: SHIPPED | OUTPATIENT
Start: 2018-08-15 | End: 2019-01-10 | Stop reason: SDUPTHER

## 2018-08-15 NOTE — TELEPHONE ENCOUNTER
Please inform the patient that I refilled the prescription for glycolax and she is due for a follow-up visit (last seen over a year ago) for continued evaluation and management.  Thanks  HUNTER

## 2018-08-21 ENCOUNTER — OFFICE VISIT (OUTPATIENT)
Dept: CARDIOLOGY | Facility: CLINIC | Age: 73
End: 2018-08-21
Payer: MEDICARE

## 2018-08-21 VITALS
DIASTOLIC BLOOD PRESSURE: 74 MMHG | BODY MASS INDEX: 31.73 KG/M2 | WEIGHT: 226.63 LBS | SYSTOLIC BLOOD PRESSURE: 144 MMHG | HEART RATE: 52 BPM | HEIGHT: 71 IN

## 2018-08-21 DIAGNOSIS — I10 ESSENTIAL HYPERTENSION: Primary | ICD-10-CM

## 2018-08-21 DIAGNOSIS — E78.5 DYSLIPIDEMIA: ICD-10-CM

## 2018-08-21 DIAGNOSIS — Z01.810 PREOP CARDIOVASCULAR EXAM: ICD-10-CM

## 2018-08-21 PROCEDURE — 99999 PR PBB SHADOW E&M-EST. PATIENT-LVL III: CPT | Mod: PBBFAC,,, | Performed by: INTERNAL MEDICINE

## 2018-08-21 PROCEDURE — 99213 OFFICE O/P EST LOW 20 MIN: CPT | Mod: PBBFAC,PO | Performed by: INTERNAL MEDICINE

## 2018-08-21 PROCEDURE — 99214 OFFICE O/P EST MOD 30 MIN: CPT | Mod: S$PBB,,, | Performed by: INTERNAL MEDICINE

## 2018-08-21 NOTE — PROGRESS NOTES
Subjective:    Patient ID:  Darby Rodriguez is a 73 y.o. female who presents for follow-up of hypertension    HPI  She comes with no complaints, no chest pain, no shortness of breath  Main issue et this time is balance problems with frequent falls  Having hand surgery soon    Review of Systems   Constitution: Negative for decreased appetite, weakness, malaise/fatigue, weight gain and weight loss.   Cardiovascular: Negative for chest pain, dyspnea on exertion, leg swelling, palpitations and syncope.   Respiratory: Negative for cough and shortness of breath.    Gastrointestinal: Negative.    All other systems reviewed and are negative.       Objective:      Physical Exam   Constitutional: She is oriented to person, place, and time. She appears well-developed and well-nourished.   HENT:   Head: Normocephalic.   Eyes: Pupils are equal, round, and reactive to light.   Neck: Normal range of motion. Neck supple. No JVD present. Carotid bruit is not present. No thyromegaly present.   Cardiovascular: Normal rate, regular rhythm, normal heart sounds, intact distal pulses and normal pulses. PMI is not displaced. Exam reveals no gallop.   No murmur heard.  Pulmonary/Chest: Effort normal and breath sounds normal.   Abdominal: Soft. Normal appearance. She exhibits no mass. There is no hepatosplenomegaly. There is no tenderness.   Musculoskeletal: Normal range of motion. She exhibits no edema.   Neurological: She is alert and oriented to person, place, and time. She has normal strength and normal reflexes. No sensory deficit.   Skin: Skin is warm and intact.   Psychiatric: She has a normal mood and affect.   Nursing note and vitals reviewed.        Assessment:       1. Essential hypertension    2. Preop cardiovascular exam    3. Dyslipidemia         Plan:     No contraindication for surgery/anesthesia from cardiac standpoint  9 m f/u

## 2018-08-28 ENCOUNTER — INITIAL CONSULT (OUTPATIENT)
Dept: NEUROSURGERY | Facility: CLINIC | Age: 73
End: 2018-08-28
Payer: MEDICARE

## 2018-08-28 VITALS
SYSTOLIC BLOOD PRESSURE: 150 MMHG | BODY MASS INDEX: 31.73 KG/M2 | HEIGHT: 71 IN | WEIGHT: 226.63 LBS | DIASTOLIC BLOOD PRESSURE: 69 MMHG | RESPIRATION RATE: 20 BRPM | HEART RATE: 60 BPM

## 2018-08-28 DIAGNOSIS — G89.29 CHRONIC BILATERAL LOW BACK PAIN WITHOUT SCIATICA: ICD-10-CM

## 2018-08-28 DIAGNOSIS — M47.816 SPONDYLOSIS OF LUMBAR REGION WITHOUT MYELOPATHY OR RADICULOPATHY: ICD-10-CM

## 2018-08-28 DIAGNOSIS — M54.2 CHRONIC NECK PAIN: ICD-10-CM

## 2018-08-28 DIAGNOSIS — G56.02 LEFT CARPAL TUNNEL SYNDROME: ICD-10-CM

## 2018-08-28 DIAGNOSIS — M54.50 CHRONIC BILATERAL LOW BACK PAIN WITHOUT SCIATICA: ICD-10-CM

## 2018-08-28 DIAGNOSIS — M47.812 SPONDYLOSIS OF CERVICAL REGION WITHOUT MYELOPATHY OR RADICULOPATHY: Primary | ICD-10-CM

## 2018-08-28 DIAGNOSIS — G89.29 CHRONIC NECK PAIN: ICD-10-CM

## 2018-08-28 PROCEDURE — 99204 OFFICE O/P NEW MOD 45 MIN: CPT | Mod: S$PBB,,, | Performed by: PHYSICIAN ASSISTANT

## 2018-08-28 PROCEDURE — 99999 PR PBB SHADOW E&M-EST. PATIENT-LVL V: CPT | Mod: PBBFAC,,, | Performed by: PHYSICIAN ASSISTANT

## 2018-08-28 PROCEDURE — 99215 OFFICE O/P EST HI 40 MIN: CPT | Mod: PBBFAC,PN | Performed by: PHYSICIAN ASSISTANT

## 2018-08-28 NOTE — LETTER
August 29, 2018      Melvin Otoole, [facility]  [address] [address] [address]           [facility]  [address] [facility] [address] [place] [address]  Phone: [phone]  Fax: [phone]          Patient: Darby Rodriguez   MR Number: [id number]   YOB: 1945   Date of Visit: 8/28/2018       Dear Melvin Otoole:    Thank you for referring Darby Rodriguez to me for evaluation. Attached you will find relevant portions of my assessment and plan of care.    If you have questions, please do not hesitate to call me. I look forward to following Darby Rodriguez along with you.    Sincerely,    Mile Regan, RAHAT    Enclosure  CC:  No Recipients    If you would like to receive this communication electronically, please contact [email or web address] or [phone] to request more information on [employer] access.    For providers and/or their staff who would like to refer a patient to [facility], please contact us through our one-stop-shop provider referral line, [facility], at [phone].    If you feel you have received this communication in error or would no longer like to receive these types of communications, please e-mail [email or web address]

## 2018-08-28 NOTE — PROGRESS NOTES
[facility] - Neurosurgery  Clinic Consult     Consult Requested By: TRINIDAD Youssef    SUBJECTIVE:     Chief Complaint:   Chief Complaint   Patient presents with    Cervical Spine Pain (C-spine)    Lumbar Spine Pain (L-Spine)       History of Present Illness:  Darby Rodriguez is a 73 y.o. female who presents for evaluation of neck and low back pain. Patient reports neck pain for approximately 3 months without any trauma or eliciting events. She has been experiencing low back pain x1 month following a fall. She reports she has been experiencing balance issues which has caused her to fall. Her low back pain is worse than her neck pain. She describes it as a aching pain that is constant. She denies pain that radiates into her arms or legs. She does report numbness/tingling in her left hand. She reports she was diagnosed with carpal tunnel on the left by EMG and will undergo CTR with Dr. Valverde once she schedules the procedure. Her back pain is worse with sitting and walking. Her neck pain is worse with turning her head. She takes extra strength Tylenol and uses a heating pad which relieves her pain. She cannot take NSAIDs due to a kidney cyst. She is right handed. She reports difficulty with buttoning shirts and other hand fine motor tasks. She also drops objects frequently. She reports urinary urgency. She has not attended physical therapy for her issues and she does not see pain management.     VAS neck 6/10, arm 0, back 9/10, leg 0   PHQ 7  Oswestry Disability Index: neck 50%, back 56%    Past Medical History:   Diagnosis Date    Allergy     Anemia     Arthritis     Atrial fibrillation     Cervical disc disease     Colon polyp     Degenerative joint disease (DJD) of hip     (L); last injected 4/1/15    Diverticulosis     DJD (degenerative joint disease) of knee     left; last injected 4/1/15    Encounter for blood transfusion     GERD (gastroesophageal reflux disease)     Gout      "Headache(784.0)     HTN (hypertension)     Hypothyroidism (acquired)     IBS (irritable bowel syndrome) 7/29/2013    Lumbar disc disease     Mixed hyperlipidemia     PONV (postoperative nausea and vomiting)     Renal insufficiency     Dr. Greene    Sleep apnea      Past Surgical History:   Procedure Laterality Date    COLONOSCOPY  2010?  [facility]    (diverticulosis?)    COLONOSCOPY  03/2015    Dr. Pickett, repeat in 5 years    COLONOSCOPY W/ POLYPECTOMY  11/12/2010  [facility]    Two 5 mm polyps in the sigmoid colon.  Diverticulosis (sigmoid, descending, and transverse colon).  Melanosis.    COLONOSCOPY W/ POLYPECTOMY  6/17/13  [facility]    One 1 mm polyp in the distal sigmoid colon.  TUBULAR ADENOMA.  Diverticulosis in the sigmoid colon.  Moderate colonic spasm consistent with IBS.  Redundant colon.  Melanosis in the colon.    ESOPHAGOGASTRODUODENOSCOPY  4/27/2012  [facility]    Normal esophagus.  Small hiatal hernia.  Antrum erythema.  REACTIVE/CHEMICAL GASTROPATHY.  NO HELICOBACTER SEEN.    ESOPHAGOSCOPY W/ DILATION  1/5/2015  [facility]    Benign-appearing esophageal stricture or "ring," dilated 54 Fr.  Reflux esophagitis. (NERD).  Hiatus hernia.  Erythematous mucosa in the antrum.  NONNEOPLASTIC GASTRIC MUCOSA SHOWING REACTIVE/REPARATIVE CHANGES.  CECY Test  Negative.    HYSTERECTOMY  1980    Partial    JOINT REPLACEMENT  4/15/11    (R) TKA    JOINT REPLACEMENT  8/19/09    (R)PAVAN    KNEE ARTHROSCOPY      (R) knee    RADIOFREQUENCY ABLATION Right 10/20/2014    UPPER GASTROINTESTINAL ENDOSCOPY  01/2015    Dr. Pickett     Family History   Problem Relation Age of Onset    Heart disease Sister 50        mi    Ovarian cancer Sister 53    Diabetes Mother     Colon cancer Father 65    Breast cancer Neg Hx     Crohn's disease Neg Hx     Ulcerative colitis Neg Hx      Social History     Tobacco Use    Smoking status: Never Smoker    Smokeless tobacco: Never Used   Substance Use Topics    Alcohol use: No    Drug use: " No      Review of patient's allergies indicates:   Allergen Reactions    Iodine      Blurred vision on 2 occasions.    Meperidine      Other reaction(s): hyperventalating  Other reaction(s): blurred vision    Penicillins     Promethazine      Other reaction(s): blurred vision       Current Outpatient Medications:     allopurinol (ZYLOPRIM) 300 MG tablet, Take 1 tablet (300 mg total) by mouth once daily., Disp: 90 tablet, Rfl: 3    ascorbic acid (VITAMIN C) 1000 MG tablet, Take 1,000 mg by mouth once daily. , Disp: , Rfl:     aspirin (ECOTRIN) 81 MG EC tablet, Take 81 mg by mouth once a week. , Disp: , Rfl:     atenolol (TENORMIN) 100 MG tablet, Take 1 tablet (100 mg total) by mouth once daily., Disp: 90 tablet, Rfl: 3    atorvastatin (LIPITOR) 10 MG tablet, TAKE ONE TABLET BY MOUTH ONCE DAILY., Disp: 90 tablet, Rfl: 3    baclofen (LIORESAL) 10 MG tablet, Take 1 tablet (10 mg total) by mouth every evening., Disp: 20 tablet, Rfl: 1    calcitRIOL (ROCALTROL) 0.25 MCG Cap, Take 0.25 mcg by mouth. Monday through Friday., Disp: , Rfl: 4    cholecalciferol, vitamin D3, (VITAMIN D3) 2,000 unit Cap, Take 1 capsule by mouth once daily. Mon - Fri, Disp: , Rfl:     colchicine 0.6 mg tablet, Take 1 tablet (0.6 mg total) by mouth 2 (two) times daily as needed., Disp: 60 tablet, Rfl: 5    dicyclomine (BENTYL) 10 MG capsule, TAKE 1 OR 2 CAPSULES BY MOUTH UP TO FOUR TIMES DAILY USUALLY BEFORE MEALS AND AT BEDTIME TO EASE BLOATING AND GAS PAINS, Disp: 120 capsule, Rfl: 11    diltiaZEM (CARDIZEM CD) 180 MG 24 hr capsule, Take 180 mg by mouth nightly., Disp: , Rfl: 3    ferrous sulfate 325 mg (65 mg iron) Tab, Take 1 tablet by mouth. Pt takes 2 times a week, Disp: , Rfl:     furosemide (LASIX) 80 MG tablet, TAKE 1 TABLET (80 MG TOTAL) BY MOUTH 2 (TWO) TIMES DAILY., Disp: 60 tablet, Rfl: 11    gabapentin (NEURONTIN) 600 MG tablet, TAKE 1 TABLET (600 MG TOTAL) BY MOUTH 3 (THREE) TIMES DAILY., Disp: 270 tablet, Rfl: 2     hydrocortisone (ANUSOL-HC) 25 mg suppository, UNWRAP AND INSERT 1 SUPPOSITORY RECTALLY 2 (TWO) TIMES DAILY AS NEEDED FOR HEMORRHOIDS., Disp: , Rfl: 2    levothyroxine (SYNTHROID) 112 MCG tablet, TAKE ONE TABLET BY MOUTH ONCE DAILY., Disp: 30 tablet, Rfl: 12    LORazepam (ATIVAN) 0.5 MG tablet, Take 1 tablet (0.5 mg total) by mouth every 8 (eight) hours as needed for Anxiety., Disp: 40 tablet, Rfl: 1    meclizine (ANTIVERT) 25 mg tablet, Take 1 tablet (25 mg total) by mouth 3 (three) times daily as needed., Disp: 40 tablet, Rfl: 2    multivitamin (MULTIVITAMIN) per tablet, Take 1 tablet by mouth once daily.  , Disp: , Rfl:     omeprazole (PRILOSEC) 40 MG capsule, TAKE 1 CAPSULE (40 MG TOTAL) BY MOUTH ONCE DAILY., Disp: 90 capsule, Rfl: 1    oxyCODONE-acetaminophen (PERCOCET)  mg per tablet, Take 1 tablet by mouth every 12 (twelve) hours as needed for Pain., Disp: 50 tablet, Rfl: 0    polyethylene glycol (GLYCOLAX) 17 gram/dose powder, MIX AND TAKE 17 GRAMS BY MOUTH ONCE DAILY., Disp: 527 g, Rfl: 1    potassium chloride SA (K-DUR,KLOR-CON) 20 MEQ tablet, TAKE ONE TABLET BY MOUTH TWO TIMES DAILY., Disp: 180 tablet, Rfl: 13    ranitidine (ZANTAC) 300 MG capsule, Take 1 capsule (300 mg total) by mouth nightly. , about 1 hour before bedtime., Disp: 30 capsule, Rfl: 6    triamcinolone acetonide 0.1% (KENALOG) 0.1 % cream, 2 (two) times daily., Disp: , Rfl: 5    valACYclovir (VALTREX) 1000 MG tablet, TAKE ONE TABLET BY MOUTH THREE TIMES A DAY, Disp: 21 tablet, Rfl: 7    Review of Systems:   Constitutional: no fever, chills or night sweats. No changes in weight   Eyes: no visual changes   ENT: no nasal congestion or sore throat   Respiratory: no cough or shortness of breath   Cardiovascular: no chest pain or palpitations   Gastrointestinal: no nausea or vomiting   Genitourinary: no hematuria or dysuria   Integument/Breast: no rash or pruritis   Hematologic/Lymphatic: no easy bruising or lymphadenopathy    Musculoskeletal: positive for neck and low back pain   Neurological: no seizures or tremors, +numbness/tingling left hand         OBJECTIVE:     Vital Signs (Most Recent):  Pulse: 60 (08/28/18 1548)  Resp: 20 (08/28/18 1548)  BP: (!) 150/69 (08/28/18 1548)    Physical Exam:   General: well developed, well nourished, no distress.   Neurologic: Alert and oriented. Thought content appropriate. GCS 15.   Language: No aphasia  Speech: No dysarthria  Cranial nerves: face symmetric, tongue midline, CN II-XII grossly intact.   Head: normocephalic, atraumatic  Eyes: EOMI.  Neck: trachea midline, no JVD   Cardiovascular: no LE edema  Pulmonary: normal respirations, no signs of respiratory distress  Abdomen: soft, non-distended  Sensory: intact to light touch throughout  Skin: Skin is warm, dry and intact.    Motor Strength: Moves all extremities spontaneously with good tone. No abnormal movements seen.     Strength  Deltoids Triceps Biceps Wrist Extension Wrist Flexion Hand  Interossei    Upper: R 5/5 5/5 5/5 5/5 5/5 5/5 5/5     L 5/5 5/5 5/5 5/5 5/5 5/5 5/5      Iliopsoas Quadriceps Knee  Flexion Tibialis  anterior Gastro- cnemius EHL Foot Inversion Foot Eversion   Lower: R 5/5 5/5 5/5 5/5 5/5 5/5 5/5 5/5    L 5/5 5/5 5/5 5/5 5/5 5/5 5/5 5/5     DTR's: 2 + and symmetric in UE and LE  Dunaway: absent  Clonus: absent    +tinel's left wrist     Gait: normal    Tandem Gait: mild difficulty           Able to stand on heels & toes  Cervical Spine: decreased ROM, pain with rotation, no TTP, negative Spurling's     Lumbar Spine: full ROM, no TTP  Straight leg raise: negative bilaterally           Diagnostic Results:  I have independently reviewed the following imaging:  MRI cervical spine: multilevel degenerative changes with spondylolisthesis at C3-4, C5-6, and C6-7. There is mild to moderate foraminal stenosis throughout. There is no significant spinal canal stenosis in the cervical spine.  MRI lumbar spine: there is  advanced degenerative changes seen throughout. There is no significant spinal canal stenosis. There is foraminal stenosis throughout ranging mild to severe. There is moderate to severe facet hypertrophy throughout.   XR lumbar spine and sacrum: no fractures seen, degenerative changes throughout      ASSESSMENT/PLAN:     Darby Rodriguez is a 73 y.o. female with cervical and lumbar spondylosis. The patient has been experiencing neck pain x3 months and low back pain x1 month. The patient is neurologically intact on exam without signs of radiculopathy or myelopathy. There is positive Tinel's at the left wrist and patient reports a positive EMG for carpal tunnel and is to undergo carpal tunnel release. The patient has not tried any conservative management for her pain. There is no surgical intervention recommended at this time. We recommend attending physical therapy for her neck and low back pain. There is a referral to pain management in the system if the patient does not improve with physical therapy. We recommend following up with Dr. Valverde for the left carpal tunnel.       Spondylosis of cervical region without myelopathy or radiculopathy  -     Ambulatory Referral to Physical/Occupational Therapy  -     Ambulatory Referral to Physical/Occupational Therapy  -     Ambulatory Referral to Pain Clinic    Spondylosis of lumbar region without myelopathy or radiculopathy  -     Ambulatory Referral to Physical/Occupational Therapy  -     Ambulatory Referral to Physical/Occupational Therapy  -     Ambulatory Referral to Pain Clinic    Chronic bilateral low back pain without sciatica  -     Ambulatory Referral to Physical/Occupational Therapy  -     Ambulatory Referral to Physical/Occupational Therapy  -     Ambulatory Referral to Pain Clinic    Chronic neck pain  -     Ambulatory Referral to Physical/Occupational Therapy  -     Ambulatory Referral to Physical/Occupational Therapy  -     Ambulatory Referral to Pain  Clinic    Left carpal tunnel syndrome  -     Ambulatory Referral to Physical/Occupational Therapy  -     Ambulatory Referral to Pain Clinic        Tierney Gates PA-C  Neurosurgery - [facility]

## 2018-09-07 ENCOUNTER — TELEPHONE (OUTPATIENT)
Dept: CARDIOLOGY | Facility: CLINIC | Age: 73
End: 2018-09-07

## 2018-09-07 NOTE — TELEPHONE ENCOUNTER
----- Message from Candelaria Griffiths sent at 9/6/2018  4:49 PM CDT -----  Contact: self   Type:  Patient Returning Call    Who Called: patient   Who Left Message for Patient: Charlette   Does the patient know what this is regarding?: n/a  Best Call Back Number:  [phone]

## 2018-09-07 NOTE — TELEPHONE ENCOUNTER
----- Message from Bertha Manning sent at 9/7/2018 10:45 AM CDT -----  Contact: Patient  Type:  Patient Returning Call    Who Called:  patient  Who Left Message for Patient:  Charlette Ajy  Does the patient know what this is regarding?:  na  Best Call Back Number:  [phone] (home)

## 2018-10-01 ENCOUNTER — TELEPHONE (OUTPATIENT)
Dept: CARDIOLOGY | Facility: CLINIC | Age: 73
End: 2018-10-01

## 2018-10-01 NOTE — TELEPHONE ENCOUNTER
----- Message from Cong Rivera sent at 10/1/2018  8:50 AM CDT -----  Type:  Patient Returning Call    Who Called:  Patient  Who Left Message for Patient:  Charlette  Does the patient know what this is regarding?:  Clearance  Best Call Back Number:  [phone]

## 2018-10-01 NOTE — TELEPHONE ENCOUNTER
Request for Cardiac Clearance    Darby Rodriguez  is having hand surgery and needs clearance.     Please advise on any medication holds.     Please indicate if patient is cleared from a Cardiac standpoint.          Please faxed clearance to Dr. Marsh office. [facility] fax# [phone]

## 2018-10-10 ENCOUNTER — LAB VISIT (OUTPATIENT)
Dept: LAB | Facility: HOSPITAL | Age: 73
End: 2018-10-10
Attending: INTERNAL MEDICINE
Payer: MEDICARE

## 2018-10-10 DIAGNOSIS — D63.1 ANEMIA IN STAGE 2 CHRONIC KIDNEY DISEASE: ICD-10-CM

## 2018-10-10 DIAGNOSIS — N18.2 ANEMIA IN STAGE 2 CHRONIC KIDNEY DISEASE: ICD-10-CM

## 2018-10-10 LAB
ALBUMIN SERPL BCP-MCNC: 4.1 G/DL
ALP SERPL-CCNC: 99 U/L
ALT SERPL W/O P-5'-P-CCNC: 54 U/L
ANION GAP SERPL CALC-SCNC: 7 MMOL/L
AST SERPL-CCNC: 35 U/L
BASOPHILS # BLD AUTO: 0.01 K/UL
BASOPHILS NFR BLD: 0.2 %
BILIRUB SERPL-MCNC: 0.6 MG/DL
BUN SERPL-MCNC: 20 MG/DL
CALCIUM SERPL-MCNC: 9.6 MG/DL
CHLORIDE SERPL-SCNC: 110 MMOL/L
CO2 SERPL-SCNC: 24 MMOL/L
CREAT SERPL-MCNC: 1.21 MG/DL
DIFFERENTIAL METHOD: ABNORMAL
EOSINOPHIL # BLD AUTO: 0 K/UL
EOSINOPHIL NFR BLD: 0.6 %
ERYTHROCYTE [DISTWIDTH] IN BLOOD BY AUTOMATED COUNT: 14.5 %
EST. GFR  (AFRICAN AMERICAN): 52 ML/MIN/1.73 M^2
EST. GFR  (NON AFRICAN AMERICAN): 45 ML/MIN/1.73 M^2
GLUCOSE SERPL-MCNC: 106 MG/DL
HCT VFR BLD AUTO: 36 %
HGB BLD-MCNC: 11.6 G/DL
LYMPHOCYTES # BLD AUTO: 0.9 K/UL
LYMPHOCYTES NFR BLD: 16.9 %
MCH RBC QN AUTO: 30.2 PG
MCHC RBC AUTO-ENTMCNC: 32.2 G/DL
MCV RBC AUTO: 94 FL
MONOCYTES # BLD AUTO: 0.4 K/UL
MONOCYTES NFR BLD: 8.4 %
NEUTROPHILS # BLD AUTO: 3.9 K/UL
NEUTROPHILS NFR BLD: 73.9 %
NRBC BLD-RTO: 0 /100 WBC
PLATELET # BLD AUTO: 159 K/UL
PMV BLD AUTO: 10.1 FL
POTASSIUM SERPL-SCNC: 4.5 MMOL/L
PROT SERPL-MCNC: 6.9 G/DL
RBC # BLD AUTO: 3.84 M/UL
SODIUM SERPL-SCNC: 141 MMOL/L
WBC # BLD AUTO: 5.22 K/UL

## 2018-10-10 PROCEDURE — 36415 COLL VENOUS BLD VENIPUNCTURE: CPT | Mod: PN

## 2018-10-10 PROCEDURE — 80053 COMPREHEN METABOLIC PANEL: CPT | Mod: PN

## 2018-10-10 PROCEDURE — 85025 COMPLETE CBC W/AUTO DIFF WBC: CPT

## 2018-10-10 PROCEDURE — 85025 COMPLETE CBC W/AUTO DIFF WBC: CPT | Mod: PN

## 2018-10-10 PROCEDURE — 80053 COMPREHEN METABOLIC PANEL: CPT

## 2019-01-10 ENCOUNTER — OFFICE VISIT (OUTPATIENT)
Dept: GASTROENTEROLOGY | Facility: CLINIC | Age: 74
End: 2019-01-10
Payer: MEDICARE

## 2019-01-10 ENCOUNTER — LAB VISIT (OUTPATIENT)
Dept: LAB | Facility: HOSPITAL | Age: 74
End: 2019-01-10
Attending: NURSE PRACTITIONER
Payer: MEDICARE

## 2019-01-10 VITALS
SYSTOLIC BLOOD PRESSURE: 133 MMHG | WEIGHT: 217.38 LBS | HEIGHT: 71 IN | DIASTOLIC BLOOD PRESSURE: 73 MMHG | BODY MASS INDEX: 30.43 KG/M2 | RESPIRATION RATE: 18 BRPM | HEART RATE: 54 BPM

## 2019-01-10 DIAGNOSIS — R74.01 ELEVATED ALT MEASUREMENT: ICD-10-CM

## 2019-01-10 DIAGNOSIS — Z86.010 HISTORY OF COLON POLYPS: ICD-10-CM

## 2019-01-10 DIAGNOSIS — F11.90 OPIOID USE: ICD-10-CM

## 2019-01-10 DIAGNOSIS — Z80.0 FAMILY HISTORY OF COLON CANCER: ICD-10-CM

## 2019-01-10 DIAGNOSIS — R10.32 LLQ PAIN: Primary | ICD-10-CM

## 2019-01-10 DIAGNOSIS — R10.32 LLQ PAIN: ICD-10-CM

## 2019-01-10 DIAGNOSIS — Z87.19 HISTORY OF CHRONIC CONSTIPATION: ICD-10-CM

## 2019-01-10 DIAGNOSIS — Z87.19 HISTORY OF DIVERTICULOSIS: ICD-10-CM

## 2019-01-10 LAB
ALBUMIN SERPL BCP-MCNC: 3.8 G/DL
ALP SERPL-CCNC: 115 U/L
ALT SERPL W/O P-5'-P-CCNC: 41 U/L
ANION GAP SERPL CALC-SCNC: 7 MMOL/L
AST SERPL-CCNC: 36 U/L
BASOPHILS # BLD AUTO: 0.01 K/UL
BASOPHILS NFR BLD: 0.2 %
BILIRUB SERPL-MCNC: 0.3 MG/DL
BUN SERPL-MCNC: 20 MG/DL
CALCIUM SERPL-MCNC: 10.1 MG/DL
CHLORIDE SERPL-SCNC: 108 MMOL/L
CO2 SERPL-SCNC: 27 MMOL/L
CREAT SERPL-MCNC: 1.3 MG/DL
DIFFERENTIAL METHOD: ABNORMAL
EOSINOPHIL # BLD AUTO: 0 K/UL
EOSINOPHIL NFR BLD: 0.4 %
ERYTHROCYTE [DISTWIDTH] IN BLOOD BY AUTOMATED COUNT: 14.6 %
EST. GFR  (AFRICAN AMERICAN): 47.7 ML/MIN/1.73 M^2
EST. GFR  (NON AFRICAN AMERICAN): 41.4 ML/MIN/1.73 M^2
GLUCOSE SERPL-MCNC: 99 MG/DL
HCT VFR BLD AUTO: 36.8 %
HGB BLD-MCNC: 11.1 G/DL
IMM GRANULOCYTES # BLD AUTO: 0.01 K/UL
IMM GRANULOCYTES NFR BLD AUTO: 0.2 %
LYMPHOCYTES # BLD AUTO: 0.9 K/UL
LYMPHOCYTES NFR BLD: 18.4 %
MCH RBC QN AUTO: 29.8 PG
MCHC RBC AUTO-ENTMCNC: 30.2 G/DL
MCV RBC AUTO: 99 FL
MONOCYTES # BLD AUTO: 0.4 K/UL
MONOCYTES NFR BLD: 8 %
NEUTROPHILS # BLD AUTO: 3.4 K/UL
NEUTROPHILS NFR BLD: 72.8 %
NRBC BLD-RTO: 0 /100 WBC
PLATELET # BLD AUTO: 184 K/UL
PMV BLD AUTO: 11 FL
POTASSIUM SERPL-SCNC: 5.2 MMOL/L
PROT SERPL-MCNC: 6.9 G/DL
RBC # BLD AUTO: 3.72 M/UL
SODIUM SERPL-SCNC: 142 MMOL/L
WBC # BLD AUTO: 4.61 K/UL

## 2019-01-10 PROCEDURE — 80053 COMPREHEN METABOLIC PANEL: CPT

## 2019-01-10 PROCEDURE — 99214 PR OFFICE/OUTPT VISIT, EST, LEVL IV, 30-39 MIN: ICD-10-PCS | Mod: S$PBB,,, | Performed by: NURSE PRACTITIONER

## 2019-01-10 PROCEDURE — 85025 COMPLETE CBC W/AUTO DIFF WBC: CPT

## 2019-01-10 PROCEDURE — 99215 OFFICE O/P EST HI 40 MIN: CPT | Mod: PBBFAC,PO | Performed by: NURSE PRACTITIONER

## 2019-01-10 PROCEDURE — 99999 PR PBB SHADOW E&M-EST. PATIENT-LVL V: ICD-10-PCS | Mod: PBBFAC,,, | Performed by: NURSE PRACTITIONER

## 2019-01-10 PROCEDURE — 99214 OFFICE O/P EST MOD 30 MIN: CPT | Mod: S$PBB,,, | Performed by: NURSE PRACTITIONER

## 2019-01-10 PROCEDURE — 36415 COLL VENOUS BLD VENIPUNCTURE: CPT | Mod: PO

## 2019-01-10 PROCEDURE — 99999 PR PBB SHADOW E&M-EST. PATIENT-LVL V: CPT | Mod: PBBFAC,,, | Performed by: NURSE PRACTITIONER

## 2019-01-10 RX ORDER — POLYETHYLENE GLYCOL 3350 17 G/17G
POWDER, FOR SOLUTION ORAL
Qty: 527 G | Refills: 1 | Status: SHIPPED | OUTPATIENT
Start: 2019-01-10 | End: 2019-05-25 | Stop reason: SDUPTHER

## 2019-01-10 NOTE — PATIENT INSTRUCTIONS
Abdominal Pain    Abdominal pain is pain in the stomach or belly area. Everyone has this pain from time to time. In many cases it goes away on its own. But abdominal pain can sometimes be due to a serious problem, such as appendicitis. So its important to know when to seek help.  Causes of abdominal pain  There are many possible causes of abdominal pain. Common causes in adults include:  · Constipation, diarrhea, or gas  · Stomach acid flowing back up into the esophagus (acid reflux or heartburn)  · Severe acid reflux, called GERD (gastroesophageal reflux disease)  · A sore in the lining of the stomach or small intestine (peptic ulcer)  · Inflammation of the gallbladder, liver, or pancreas  · Gallstones or kidney stones  · Appendicitis   · Intestinal blockage   · An internal organ pushing through a muscle or other tissue (hernia)  · Urinary tract infections  · In women, menstrual cramps, fibroids, or endometriosis  · Inflammation or infection of the intestines  Diagnosing the cause of abdominal pain  Your healthcare provider will do a physical exam help find the cause of your pain. If needed, tests will be ordered. Belly pain has many possible causes. So it can be hard to find the reason for your pain. Giving details about your pain can help. Tell your provider where and when you feel the pain, and what makes it better or worse. Also let your provider know if you have other symptoms such as:  · Fever  · Tiredness  · Upset stomach (nausea)  · Vomiting  · Changes in bathroom habits  Treating abdominal pain  Some causes of pain need emergency medical treatment right away. These include appendicitis or a bowel blockage. Other problems can be treated with rest, fluids, or medicines. Your healthcare provider can give you specific instructions for treatment or self-care based on what is causing your pain.  If you have vomiting or diarrhea, sip water or other clear fluids. When you are ready to eat solid foods again,  start with small amounts of easy-to-digest, low-fat foods. These include apple sauce, toast, or crackers.   When to seek medical care  Call 911 or go to the hospital right away if you:  · Cant pass stool and are vomiting  · Are vomiting blood or have bloody diarrhea or black, tarry diarrhea  · Have chest, neck, or shoulder pain  · Feel like you might pass out  · Have pain in your shoulder blades with nausea  · Have sudden, severe belly pain  · Have new, severe pain unlike any you have felt before  · Have a belly that is rigid, hard, and tender to touch  Call your healthcare provider if you have:  · Pain for more than 5 days  · Bloating for more than 2 days  · Diarrhea for more than 5 days  · A fever of 100.4°F (38.0°C) or higher, or as directed by your provider  · Pain that gets worse  · Weight loss for no reason  · Continued lack of appetite  · Blood in your stool  How to prevent abdominal pain  Here are some tips to help prevent abdominal pain:  · Eat smaller amounts of food at one time.  · Avoid greasy, fried, or other high-fat foods.  · Avoid foods that give you gas.  · Exercise regularly.  · Drink plenty of fluids.  To help prevent GERD symptoms:  · Quit smoking.  · Reduce alcohol and certain foods that increase stomach acid.  · Avoid aspirin and over-the-counter pain and fever medicines (NSAIDS or nonsteroidal anti-inflammatory drugs), if possible  · Lose extra weight.  · Finish eating at least 2 hours before you go to bed or lie down.  · Raise the head of your bed.  Date Last Reviewed: 7/1/2016  © 0084-5191 [employer]. [address] [place] [address], [place] [address]. All rights reserved. This information is not intended as a substitute for professional medical care. Always follow your healthcare professional's instructions.        Constipation (Adult)  Constipation means that you have bowel movements that are less frequent than usual. Stools often become very hard and difficult to  pass.  Constipation is very common. At some point in life it affects almost everyone. Since everyone's bowel habits are different, what is constipation to one person may not be to another. Your healthcare provider may do tests to diagnose constipation. It depends on what he or she finds when evaluating you.    Symptoms of constipation include:  · Abdominal pain  · Bloating  · Vomiting  · Painful bowel movements  · Itching, swelling, bleeding, or pain around the anus  Causes  Constipation can have many causes. These include:  · Diet low in fiber  · Too much dairy  · Not drinking enough liquids  · Lack of exercise or physical activity. This is especially true for older adults.  · Changes in lifestyle or daily routine, including pregnancy, aging, work, and travel  · Frequent use or misuse of laxatives  · Ignoring the urge to have a bowel movement or delaying it until later  · Medicines, such as certain prescription pain medicines, iron supplements, antacids, certain antidepressants, and calcium supplements  · Diseases like irritable bowel syndrome, bowel obstructions, stroke, diabetes, thyroid disease, Parkinson disease, hemorrhoids, and colon cancer  Complications  Potential complications of constipation can include:  · Hemorrhoids  · Rectal bleeding from hemorrhoids or anal fissures (skin tears)  · Hernias  · Dependency on laxatives  · Chronic constipation  · Fecal impaction  · Bowel obstruction or perforation  Home care  All treatment should be done after talking with your healthcare provider. This is especially true if you have another medical problems, are taking prescription medicines, or are an older adult. Treatment most often involves lifestyle changes. You may also need medicines. Your healthcare provider will tell you which will work best for you. Follow the advice below to help avoid this problem in the future.  Lifestyle changes  These lifestyle changes can help prevent constipation:  · Diet. Eat a  high-fiber diet, with fresh fruit and vegetables, and reduce dairy intake, meats, and processed foods  · Fluids. It's important to get enough fluids each day. Drink plenty of water when you eat more fiber. If you are on diet that limits the amount of fluid you can have, talk about this with your healthcare provider.  · Regular exercise. Check with your healthcare provider first.  Medications  Take any medicines as directed. Some laxatives are safe to use only every now and then. Others can be taken on a regular basis. Talk with your doctor or pharmacist if you have questions.  Prescription pain medicines can cause constipation. If you are taking this kind of medicine, ask your healthcare provider if you should also take a stool softener.  Medicines you may take to treat constipation include:  · Fiber supplements  · Stool softeners  · Laxatives  · Enemas  · Rectal suppositories  Follow-up care  Follow up with your healthcare provider if symptoms don't get better in the next few days. You may need to have more tests or see a specialist.  Call 911  Call 911 if any of these occur:  · Trouble breathing  · Stiff, rigid abdomen that is severely painful to touch  · Confusion  · Fainting or loss of consciousness  · Rapid heart rate  · Chest pain  When to seek medical advice  Call your healthcare provider right away if any of these occur:  · Fever over 100.4°F (38°C)  · Failure to resume normal bowel movements  · Pain in your abdomen or back gets worse  · Nausea or vomiting  · Swelling in your abdomen  · Blood in the stool  · Black, tarry stool  · Involuntary weight loss  · Weakness  Date Last Reviewed: 12/30/2015  © 8330-7128 [employer]. [address] [place] [address], [place] [address]. All rights reserved. This information is not intended as a substitute for professional medical care. Always follow your healthcare professional's instructions.

## 2019-01-10 NOTE — PROGRESS NOTES
Subjective:       Patient ID: Darby Rodriguez is a 73 y.o. female Body mass index is 30.32 kg/m².    Chief Complaint: Abdominal Pain (LLQ pain, constipation)    Established patient of Dr. Pickett & myself.    Abdominal Pain   This is a recurrent problem. Episode onset: recurred 12/2018, has had similar pain in past. The problem occurs daily. Duration: lasts a few minutes. The problem has been unchanged. The pain is located in the LLQ. The pain is at a severity of 6/10. The quality of the pain is sharp (soreness). The abdominal pain does not radiate. Associated symptoms include belching (not more than usual), constipation (chronic occasional; bowel movements are 3-4 times a week; increased fiber in diet recently; miralax prn- does better when on daily) and flatus (frequent). Pertinent negatives include no anorexia, diarrhea, dysuria, fever, frequency, hematochezia, melena, nausea, vomiting or weight loss (gained weight recently). Exacerbated by: stress, bending forwarding. Relieved by: tylenol. She has tried proton pump inhibitors and H2 blockers (bentyl 10 mg TID, prilosec 40 mg once daily, zantac 300 mg once daily; glycolax daily PRN- helps, anusol NH PRN) for the symptoms. The treatment provided moderate relief. Prior diagnostic workup includes CT scan. Her past medical history is significant for GERD (controlled overall with medications). There is no history of colon cancer, Crohn's disease, pancreatitis, PUD or ulcerative colitis.     Review of Systems   Constitutional: Negative for appetite change, chills, fatigue, fever, unexpected weight change and weight loss (gained weight recently).        Percocet PRN- taken about 3 times a week   HENT: Negative for sore throat and trouble swallowing.    Respiratory: Negative for cough, choking, chest tightness and shortness of breath.    Cardiovascular: Negative for chest pain and palpitations.   Gastrointestinal: Positive for abdominal pain, constipation (chronic occasional;  bowel movements are 3-4 times a week; increased fiber in diet recently; miralax prn- does better when on daily) and flatus (frequent). Negative for anal bleeding, anorexia, blood in stool, diarrhea, hematochezia, melena, nausea, rectal pain and vomiting.   Genitourinary: Negative for difficulty urinating, dysuria, flank pain, frequency, pelvic pain and urgency.   Neurological: Negative for weakness.        No LMP recorded. Patient has had a hysterectomy.    Past Medical History:   Diagnosis Date    Allergy     Anemia     Arthritis     Atrial fibrillation     Cervical disc disease     Colon polyp     Degenerative joint disease (DJD) of hip     (L); last injected 4/1/15    Diverticulosis     DJD (degenerative joint disease) of knee     left; last injected 4/1/15    Encounter for blood transfusion     GERD (gastroesophageal reflux disease)     Gout     Headache(784.0)     HTN (hypertension)     Hypothyroidism (acquired)     IBS (irritable bowel syndrome) 7/29/2013    Lumbar disc disease     Mixed hyperlipidemia     PONV (postoperative nausea and vomiting)     Renal insufficiency     Dr. Greene    Sleep apnea      Past Surgical History:   Procedure Laterality Date    COLONOSCOPY  2010?  [facility]ro    (diverticulosis?)    COLONOSCOPY  03/2015    Dr. Pickett, repeat in 5 years for surveillance    COLONOSCOPY N/A 3/4/2015    Performed by [facility]on [facility]ett Jr[facility]    COLONOSCOPY N/A 6/17/2013    Per[facility] by [facility] Jr[facility] at [facility]    COLONOSCOPY W/ POLYPECTOMY  11/12/2010  Ferro    Two 5 mm polyps in the sigmoid colon.  Diverticulosis (sigmoid, descending, and transverse colon).  Melanosis.    COLONOSCOPY W/ POLYPECTOMY  6/17/13  [facility]    One 1 mm polyp in the distal sigmoid colon.  TUBULAR ADENOMA.  Diverticulosis in the sigmoid colon.  Moderate colonic spasm consistent with IBS.  Redundant colon.  Melanosis in the colon.    ESOPHAGOGASTRODUODENOSCOPY  4/27/2012  Ferro     "Normal esophagus.  Small hiatal hernia.  Antrum erythema.  REACTIVE/CHEMICAL GASTROPATHY.  NO HELICOBACTER SEEN.    ESOPHAGOGASTRODUODENOSCOPY (EGD) N/A 1/5/2015    [facility]    ESOPHAGOSCOPY W/ DILATION  1/5/2015  [facility]    Benign-appearing esophageal stricture or "ring," dilated 54 Fr.  Reflux esophagitis. (NERD).  Hiatus hernia.  Erythematous mucosa in the antrum.  NONNEOPLASTIC GASTRIC MUCOSA SHOWING REACTIVE/REPARATIVE CHANGES.  CECY Test  Negative.    HYSTERECTOMY  1980    Partial- one ovary remains- patient believes the right one remains    JOINT REPLACEMENT  4/15/11    (R) TKA    JOINT REPLACEMENT  8/19/09    (R)PAVAN    KNEE ARTHROSCOPY      (R) knee    RADIOFREQUENCY ABLATION Right 10/20/2014    UPPER GASTROINTESTINAL ENDOSCOPY  01/2015    Dr. Pickett     Family History   Problem Relation Age of Onset    Heart disease Sister 50        mi    Ovarian cancer Sister 53    Diabetes Mother     Colon cancer Father 65    Breast cancer Neg Hx     Crohn's disease Neg Hx     Ulcerative colitis Neg Hx      Wt Readings from Last 10 Encounters:   01/10/19 98.6 kg (217 lb 6 oz)   11/30/18 98.9 kg (218 lb)   10/10/18 99.7 kg (219 lb 12.8 oz)   09/28/18 100.2 kg (221 lb)   09/24/18 102.5 kg (226 lb)   08/28/18 102.8 kg (226 lb 10.1 oz)   08/21/18 102.8 kg (226 lb 10.1 oz)   08/13/18 103 kg (227 lb)   08/09/18 103 kg (227 lb)   07/26/18 102.5 kg (226 lb)     Lab Results   Component Value Date    WBC 4.84 11/30/2018    HGB 11.0 (L) 11/30/2018    HCT 35.2 (L) 11/30/2018    MCV 97 11/30/2018     11/30/2018     CMP  Sodium   Date Value Ref Range Status   11/30/2018 141 136 - 145 mmol/L Final     Potassium   Date Value Ref Range Status   11/30/2018 4.6 3.5 - 5.1 mmol/L Final     Chloride   Date Value Ref Range Status   11/30/2018 106 95 - 110 mmol/L Final     CO2   Date Value Ref Range Status   11/30/2018 26 22 - 31 mmol/L Final     Glucose   Date Value Ref Range Status " "  11/30/2018 97 70 - 110 mg/dL Final     Comment:     The ADA recommends the following guidelines for fasting glucose:  Normal:       less than 100 mg/dL  Prediabetes:  100 mg/dL to 125 mg/dL  Diabetes:     126 mg/dL or higher       BUN, Bld   Date Value Ref Range Status   11/30/2018 21 (H) 7 - 18 mg/dL Final     Creatinine   Date Value Ref Range Status   11/30/2018 1.11 0.50 - 1.40 mg/dL Final     Calcium   Date Value Ref Range Status   11/30/2018 9.7 8.4 - 10.2 mg/dL Final     Total Protein   Date Value Ref Range Status   10/10/2018 6.9 6.0 - 8.4 g/dL Final     Albumin   Date Value Ref Range Status   10/10/2018 4.1 3.5 - 5.2 g/dL Final     Total Bilirubin   Date Value Ref Range Status   10/10/2018 0.6 0.2 - 1.3 mg/dL Final     Alkaline Phosphatase   Date Value Ref Range Status   10/10/2018 99 38 - 145 U/L Final     AST ([place])   Date Value Ref Range Status   12/07/2015 21 14 - 36 U/L Final     AST   Date Value Ref Range Status   10/10/2018 35 14 - 36 U/L Final     ALT   Date Value Ref Range Status   10/10/2018 54 (H) 10 - 44 U/L Final     Anion Gap   Date Value Ref Range Status   11/30/2018 9 8 - 16 mmol/L Final     eGFR if    Date Value Ref Range Status   11/30/2018 58 (A) >60 mL/min/1.73 m^2 Final     eGFR if non    Date Value Ref Range Status   11/30/2018 50 (A) >60 mL/min/1.73 m^2 Final     Comment:     Calculation used to obtain the estimated glomerular filtration  rate (eGFR) is the CKD-EPI equation.        Lab Results   Component Value Date    TSH 0.062 (L) 11/30/2018     Reviewed prior medical records including radiology report of 3/1/17 CT abdomen/pelvis & endoscopy history (see surgical history).    3/4/15 Colonoscopy was reviewed and procedure report states:   "Impression:          - One 1 mm polyp in the cecum. Resected and                        retrieved.                       - Diverticulosis in the sigmoid colon.                       - Mild colonic spasm " "consistent with irritable bowel                        syndrome.                       - Diverticulosis in the descending colon, in the                        transverse colon and at the hepatic flexure.                       - Internal hemorrhoids.                       - Hemorrhagic mucosa in the proximal ascending                        colon, due to barotrauma. Biopsied.                       - The examination was otherwise normal.                       - The examined portion of the ileum was normal.  Recommendation:      - Discharge patient to home.                       - Await pathology results.                       - Repeat colonoscopy in 5 years for surveillance.                       - High fiber diet.                       - Use fiber, for example Citrucel, Fibercon, Konsyl                        or Metamucil.                       - Take a PROBIOTIC, such as ALIGN or CULTURELLE or                        ADAM-Q (all non-prescription), every day for a                        month.                       - Continue present medications.                       - Use Bentyl (dicyclomine) 10 mg PO QID 30 min AC.                       - Call the G.I. clinic in 2 weeks for reports (if                        you haven't heard from us sooner) [phone].                       - Return to primary care physician.                       - Return to GI clinic PRN.                       - Patient has a contact number available for                        emergencies. The signs and symptoms of potential                        delayed complications were discussed with the                        patient. Return to normal activities tomorrow.                        Written discharge instructions were provided to the                        patient.                       - Return to normal activities tomorrow.".  Biopsy results:   "1-3. FRAGMENTS OF NONNEOPLASTIC COLONIC MUCOSA WITH NO ACTIVE INFLAMMATION,  ULCERATION OR " "DYSPLASIA IDENTIFIED."    1/5/15 EGD was reviewed and procedure report states:   " Impression:          - Normal oropharynx.                       - Normal cricopharyngeus.                       - Benign-appearing esophageal stricture or "ring,"                        dilated. Esophagus dilated, 54 Fr.                       - Reflux esophagitis.                       - Normal esophagus otherwise.                       - Non-erosive esophageal reflux (NERD) disease.                       - Hiatus hernia.                       - Normal gastric fundus and gastric body.                       - Erythematous mucosa in the antrum and prepyloric                        region of the stomach. Biopsied.                       - Normal examined duodenum.  Recommendation:      - Discharge patient to home.                       - Await pathology and CLOtest results.                       - Observe patient's clinical course following                        today's procedure with therapeutic intervention.                       - Follow an antireflux regimen.                       - Continue present medications.                       - Use Prilosec (omeprazole) 40 mg PO daily.                       - Use Zantac (ranitidine) 300 mg PO daily.                       - Use sucralfate tablets 2 gram PO BID for 2 weeks.                       - Call the G.I. clinic in 2 weeks for reports (if                        you haven't heard from us sooner) [phone].                       - Return to GI clinic in 6-8 weeks. ".  Biopsy results:   "FRAGMENTS OF NONNEOPLASTIC GASTRIC MUCOSA SHOWING REACTIVE/REPARATIVE CHANGES. NO  ACUTE INFLAMMATION, EVIDENCE OF H. PYLORI OR DYSPLASIA IDENTIFIED."  Objective:      Physical Exam   Constitutional: She is oriented to person, place, and time. She appears well-developed and well-nourished. No distress.   HENT:   Mouth/Throat: Oropharynx is clear and moist and mucous membranes are normal. No oral lesions. No " oropharyngeal exudate.   Eyes: Conjunctivae are normal. Pupils are equal, round, and reactive to light. No scleral icterus.   Cardiovascular: Normal rate.   Pulmonary/Chest: Effort normal and breath sounds normal. No respiratory distress. She has no wheezes.   Abdominal: Soft. Normal appearance and bowel sounds are normal. She exhibits no distension, no abdominal bruit, no ascites and no mass. There is no hepatosplenomegaly. There is tenderness (mild) in the left lower quadrant. There is no rigidity, no rebound, no guarding, no tenderness at McBurney's point and negative Hodgson's sign. No hernia.   Neurological: She is alert and oriented to person, place, and time.   Skin: Skin is warm and dry. No rash noted. She is not diaphoretic. No erythema. No pallor.   Non-jaundiced   Psychiatric: She has a normal mood and affect. Her behavior is normal.   Nursing note and vitals reviewed.      Assessment:       1. LLQ pain    2. Opioid use    3. History of colon polyps    4. Elevated ALT measurement    5. History of chronic constipation    6. History of diverticulosis    7. Family history of colon cancer        Plan:       LLQ pain  -     CT Abdomen Pelvis With Contrast; Future; Expected date: 01/10/2019  -     CBC auto differential; Future; Expected date: 01/10/2019  -     Comprehensive metabolic panel; Future; Expected date: 01/10/2019  - CONTINUE BENTYL 10 MG QID PRN AS DIRECTED  - schedule Colonoscopy to be done in 4-6 weeks, discussed procedure with the patient, patient verbalized understanding    History of chronic constipation & Opioid use  -     CT Abdomen Pelvis With Contrast; Future; Expected date: 01/10/2019  Recommended daily exercise as tolerated, adequate water intake (six 8-oz glasses of water daily), and high fiber diet. OTC fiber supplements are recommended if diet does not reach daily fiber goal (25 grams daily), such as Metamucil, Citrucel, or FiberCon (take as directed, separate from other oral medications  by >2 hours).  -Recommend taking an OTC stool softener such as Colace as directed to avoid hard stools and straining with bowel movements PRN  -  REFILL   polyethylene glycol (GLYCOLAX) 17 gram/dose powder; MIX AND TAKE 17 GRAMS BY MOUTH ONCE DAILY.  Dispense: 527 g; Refill: 1  - If no improvement with above recommendations, try intermittently dosed Dulcolax OTC as directed (every 3-4  days) PRN to facilitate bowel movements  -If still no improvement with these measures, call/follow-up  - discussed with patient that a side effect of narcotic pain medications is constipation, advised patient to talk to provider who manages pain medication and to try to stop or decrease use of narcotics, patient verbalized understanding  - schedule Colonoscopy to be done in 4-6 weeks, discussed procedure with the patient, patient verbalized understanding    History of colon polyps & Family history of colon cancer  - schedule Colonoscopy to be done in 4-6 weeks, discussed procedure with the patient, patient verbalized understanding    Elevated ALT measurement  -     Comprehensive metabolic panel; Future; Expected date: 01/10/2019  - minimize/avoid alcohol and tylenol products, & follow-up with PCP for continued evaluation and management; if specialist is needed, recommend seeing hepatology.    History of diverticulosis  - discussed the diagnosis of diverticulosis and diverticulitis, to prevent diverticulitis, high fiber diet is recommended  - discussed about antibiotic treatment, patient reports she would like to wait for ct scan results to see if antibiotics are indicated  -Recommended high fiber diet after episode has completely resolved. Recommended daily exercise, adequate water intake (six 8-oz glasses of water daily), and high fiber diet. OTC fiber supplements are recommended if diet does not reach daily fiber goal (25 grams daily), such as Metamucil, Citrucel, or FiberCon (take as directed, separate from other oral medications  by >2 hours).  - Advised to avoid/minimize popcorn, corn, seeds, and nuts.    Follow-up in about 1 month (around 2/10/2019), or if symptoms worsen or fail to improve.    If no improvement in symptoms or symptoms worsen, call/follow-up at clinic or go to ER.

## 2019-01-11 ENCOUNTER — TELEPHONE (OUTPATIENT)
Dept: GASTROENTEROLOGY | Facility: CLINIC | Age: 74
End: 2019-01-11

## 2019-01-11 NOTE — TELEPHONE ENCOUNTER
Please call to inform & review the results with the patient- blood work showed normal liver function levels; blood counts showed mild anemia persist with only mild improvement since previous blood draw; electrolytes were good except for mild elevation in potassium level at 5.2 & decreased GFR level other kidney function levels were unremarkable: Recommend to stay well-hydrated (especially before & after scheduled ct scan) & follow-up with Primary Care Provider for continued evaluation and management of these findings.    Continue with previous recommendations. If no improvement in symptoms or symptoms worsen, call/follow-up at clinic or go to ER.  Please release results to patient's mychart once you have discussed results and recommendations with patient.  Thanks,  Lani SIGALA

## 2019-01-11 NOTE — TELEPHONE ENCOUNTER
Spoke with pt and discussed results and recommendations per Ynes Elias NP. Pt verbalized understanding.

## 2019-01-14 ENCOUNTER — HOSPITAL ENCOUNTER (OUTPATIENT)
Dept: RADIOLOGY | Facility: HOSPITAL | Age: 74
Discharge: HOME | End: 2019-01-14
Attending: NURSE PRACTITIONER
Payer: MEDICARE

## 2019-01-14 DIAGNOSIS — R10.32 LLQ PAIN: ICD-10-CM

## 2019-01-14 DIAGNOSIS — K59.00 CONSTIPATION, UNSPECIFIED CONSTIPATION TYPE: ICD-10-CM

## 2019-01-14 PROCEDURE — 25500020 PHARM REV CODE 255: Mod: PO | Performed by: NURSE PRACTITIONER

## 2019-01-14 PROCEDURE — 74176 CT ABD & PELVIS W/O CONTRAST: CPT | Mod: TC,PO

## 2019-01-14 PROCEDURE — 74176 CT ABD & PELVIS W/O CONTRAST: CPT | Mod: 26,,, | Performed by: RADIOLOGY

## 2019-01-14 PROCEDURE — 74176 CT ABDOMEN PELVIS WITHOUT CONTRAST: ICD-10-PCS | Mod: 26,,, | Performed by: RADIOLOGY

## 2019-01-14 PROCEDURE — A9698 NON-RAD CONTRAST MATERIALNOC: HCPCS | Mod: PO | Performed by: NURSE PRACTITIONER

## 2019-01-14 RX ADMIN — Medication 900 ML: at 11:01

## 2019-01-17 ENCOUNTER — TELEPHONE (OUTPATIENT)
Dept: GASTROENTEROLOGY | Facility: CLINIC | Age: 74
End: 2019-01-17

## 2019-01-17 NOTE — TELEPHONE ENCOUNTER
"Please call to inform & review the results with the patient- radiology report of the ct abdomen pelvis showed "Diverticulosis coli.  No evidence of acute diverticulitis." No infection/inflammation. Recommend high fiber diet (20-30 grams of fiber daily)/OTC fiber supplements daily as directed.    Umbilical hernia noted: if it becomes painful or if it does not reduce patient needs to see a general surgeon or go to the ER; otherwise, it can be monitored.  "Small hiatal hernia and possible gastroesophageal reflux.  There is a suggestion of gastric fold thickening versus nondistention involving the proximal stomach": - recommend scheduling EGD, to further evaluate findings. Continue GERD recommendations.    Other findings showed "Multilevel degenerative change of the lumbar spine", enlarged heart, possible atelectasis vs. Fibrosis of the left lung base: Recommend follow-up with Primary Care Provider/ortho for continued evaluation and management of these findings.  Otherwise, unremarkable findings.    Continue with previous recommendations. If no improvement in symptoms or symptoms worsen, call/follow-up at clinic or go to ER.  Please release results to patient's mychart once you have discussed results and recommendations with patient.  Thanks,  Lani ABDI-[place]IA    "

## 2019-01-17 NOTE — PATIENT INSTRUCTIONS

## 2019-01-17 NOTE — TELEPHONE ENCOUNTER
Spoke with pt. Informed of results & recommendations per FREDO Elias NP. Pt verbalized understanding & asked for radiology report to be mailed to her. Radiology report & handout Ynes attached to message placed in mail for pt.

## 2019-02-26 ENCOUNTER — ANESTHESIA EVENT (OUTPATIENT)
Dept: ENDOSCOPY | Facility: HOSPITAL | Age: 74
End: 2019-02-26
Payer: MEDICARE

## 2019-02-26 ENCOUNTER — ANESTHESIA (OUTPATIENT)
Dept: ENDOSCOPY | Facility: HOSPITAL | Age: 74
End: 2019-02-26
Payer: MEDICARE

## 2019-02-26 ENCOUNTER — HOSPITAL ENCOUNTER (OUTPATIENT)
Facility: HOSPITAL | Age: 74
Discharge: HOME | End: 2019-02-26
Attending: INTERNAL MEDICINE | Admitting: INTERNAL MEDICINE
Payer: MEDICARE

## 2019-02-26 VITALS
RESPIRATION RATE: 16 BRPM | TEMPERATURE: 97 F | SYSTOLIC BLOOD PRESSURE: 129 MMHG | DIASTOLIC BLOOD PRESSURE: 67 MMHG | OXYGEN SATURATION: 97 % | BODY MASS INDEX: 31.5 KG/M2 | WEIGHT: 220 LBS | HEART RATE: 58 BPM | HEIGHT: 70 IN

## 2019-02-26 DIAGNOSIS — R10.32 LLQ PAIN: ICD-10-CM

## 2019-02-26 PROCEDURE — 45380 COLONOSCOPY AND BIOPSY: CPT | Mod: PO | Performed by: INTERNAL MEDICINE

## 2019-02-26 PROCEDURE — 88305 TISSUE EXAM BY PATHOLOGIST: CPT | Performed by: PATHOLOGY

## 2019-02-26 PROCEDURE — 25000003 PHARM REV CODE 250: Mod: PO | Performed by: INTERNAL MEDICINE

## 2019-02-26 PROCEDURE — 88305 TISSUE EXAM BY PATHOLOGIST: CPT | Mod: 26,,, | Performed by: PATHOLOGY

## 2019-02-26 PROCEDURE — 37000008 HC ANESTHESIA 1ST 15 MINUTES: Mod: PO | Performed by: INTERNAL MEDICINE

## 2019-02-26 PROCEDURE — D9220A PRA ANESTHESIA: ICD-10-PCS | Mod: CRNA,,, | Performed by: NURSE ANESTHETIST, CERTIFIED REGISTERED

## 2019-02-26 PROCEDURE — D9220A PRA ANESTHESIA: Mod: ANES,,, | Performed by: ANESTHESIOLOGY

## 2019-02-26 PROCEDURE — D9220A PRA ANESTHESIA: Mod: CRNA,,, | Performed by: NURSE ANESTHETIST, CERTIFIED REGISTERED

## 2019-02-26 PROCEDURE — 45380 PR COLONOSCOPY,BIOPSY: ICD-10-PCS | Mod: ,,, | Performed by: INTERNAL MEDICINE

## 2019-02-26 PROCEDURE — 45380 COLONOSCOPY AND BIOPSY: CPT | Mod: ,,, | Performed by: INTERNAL MEDICINE

## 2019-02-26 PROCEDURE — 37000009 HC ANESTHESIA EA ADD 15 MINS: Mod: PO | Performed by: INTERNAL MEDICINE

## 2019-02-26 PROCEDURE — 63600175 PHARM REV CODE 636 W HCPCS: Mod: PO | Performed by: NURSE ANESTHETIST, CERTIFIED REGISTERED

## 2019-02-26 PROCEDURE — 27201012 HC FORCEPS, HOT/COLD, DISP: Mod: PO | Performed by: INTERNAL MEDICINE

## 2019-02-26 PROCEDURE — 88305 TISSUE SPECIMEN TO PATHOLOGY - SURGERY: ICD-10-PCS | Mod: 26,,, | Performed by: PATHOLOGY

## 2019-02-26 PROCEDURE — D9220A PRA ANESTHESIA: ICD-10-PCS | Mod: ANES,,, | Performed by: ANESTHESIOLOGY

## 2019-02-26 RX ORDER — PROPOFOL 10 MG/ML
VIAL (ML) INTRAVENOUS CONTINUOUS PRN
Status: DISCONTINUED | OUTPATIENT
Start: 2019-02-26 | End: 2019-02-26

## 2019-02-26 RX ORDER — LIDOCAINE HCL/PF 100 MG/5ML
SYRINGE (ML) INTRAVENOUS
Status: DISCONTINUED | OUTPATIENT
Start: 2019-02-26 | End: 2019-02-26

## 2019-02-26 RX ORDER — SODIUM CHLORIDE 0.9 % (FLUSH) 0.9 %
3 SYRINGE (ML) INJECTION
Status: DISCONTINUED | OUTPATIENT
Start: 2019-02-26 | End: 2019-02-26 | Stop reason: HOSPADM

## 2019-02-26 RX ORDER — PROPOFOL 10 MG/ML
VIAL (ML) INTRAVENOUS
Status: DISCONTINUED | OUTPATIENT
Start: 2019-02-26 | End: 2019-02-26

## 2019-02-26 RX ORDER — LIDOCAINE HYDROCHLORIDE 10 MG/ML
1 INJECTION, SOLUTION EPIDURAL; INFILTRATION; INTRACAUDAL; PERINEURAL ONCE
Status: COMPLETED | OUTPATIENT
Start: 2019-02-26 | End: 2019-02-26

## 2019-02-26 RX ORDER — SODIUM CHLORIDE, SODIUM LACTATE, POTASSIUM CHLORIDE, CALCIUM CHLORIDE 600; 310; 30; 20 MG/100ML; MG/100ML; MG/100ML; MG/100ML
INJECTION, SOLUTION INTRAVENOUS CONTINUOUS
Status: DISCONTINUED | OUTPATIENT
Start: 2019-02-27 | End: 2019-02-26 | Stop reason: HOSPADM

## 2019-02-26 RX ADMIN — PROPOFOL 100 MG: 10 INJECTION, EMULSION INTRAVENOUS at 10:02

## 2019-02-26 RX ADMIN — SODIUM PHOSPHATE, DIBASIC AND SODIUM PHOSPHATE, MONOBASIC 1 ENEMA: 7; 19 ENEMA RECTAL at 09:02

## 2019-02-26 RX ADMIN — SODIUM CHLORIDE, SODIUM LACTATE, POTASSIUM CHLORIDE, AND CALCIUM CHLORIDE: .6; .31; .03; .02 INJECTION, SOLUTION INTRAVENOUS at 10:02

## 2019-02-26 RX ADMIN — PROPOFOL 150 MCG/KG/MIN: 10 INJECTION, EMULSION INTRAVENOUS at 10:02

## 2019-02-26 RX ADMIN — LIDOCAINE HYDROCHLORIDE 5 MG: 10 INJECTION, SOLUTION EPIDURAL; INFILTRATION; INTRACAUDAL; PERINEURAL at 10:02

## 2019-02-26 RX ADMIN — LIDOCAINE HYDROCHLORIDE 100 MG: 20 INJECTION, SOLUTION INTRAVENOUS at 10:02

## 2019-02-26 NOTE — OR NURSING
In preop, following prep for Colonoscopy, pt reports results continue to contain some solid, brown stool. Dr. Pickett notified and ordered Fleets enema x1. Pt assisted to restroom to administer enema. RN to notify Dr. Pickett of results.

## 2019-02-26 NOTE — OR NURSING
Pt reports brown, liquid stool following fleets x1. Dr. Pickett notified. Ok to proceed with procedure as scheduled. No new orders received at this time.

## 2019-02-26 NOTE — ANESTHESIA POSTPROCEDURE EVALUATION
"Anesthesia Post Evaluation    Patient: Darby Rodriguez    Procedure(s) Performed: Procedure(s) (LRB):  COLONOSCOPY (N/A)    Final Anesthesia Type: general  Patient location during evaluation: PACU  Patient participation: Yes- Able to Participate  Level of consciousness: awake and alert, oriented and awake  Post-procedure vital signs: reviewed and stable  Pain management: adequate  Airway patency: patent  PONV status at discharge: No PONV  Anesthetic complications: no      Cardiovascular status: blood pressure returned to baseline and hemodynamically stable  Respiratory status: unassisted, spontaneous ventilation and room air  Hydration status: euvolemic  Follow-up not needed.        Visit Vitals  /67   Pulse (!) 58   Temp 36.1 °C (97 °F) (Skin)   Resp 16   Ht 5' 10" (1.778 m)   Wt 99.8 kg (220 lb)   SpO2 97%   Breastfeeding? No   BMI 31.57 kg/m²       Pain/Romina Score: Romina Score: 10 (2/26/2019 11:45 AM)        "

## 2019-02-26 NOTE — BRIEF OP NOTE
Discharge Note  Short Stay      SUMMARY     Admit Date: 2/26/2019    Attending Physician: Leighton Pickett Jr., MD     Discharge Physician: Leighton Pickett Jr., MD    Discharge Date: 2/26/2019 11:31 AM    Final Diagnosis: LLQ pain [R10.32]  Constipation, unspecified constipation type [K59.00]  History of colonic polyps [Z86.010]  Impression:          - One 1 mm polyp in the mid ascending colon, removed                        with a cold biopsy forceps. Resected and retrieved.                       - Diverticulosis in the sigmoid colon, in the                        descending colon, in the transverse colon, at the                        hepatic flexure and in the ascending colon.                       - Mild colonic spasm consistent with irritable bowel                        syndrome.                       - Non-bleeding internal hemorrhoids.                       - The examination was otherwise normal.                       - The rectum and recto-sigmoid colon are normal.                       - The examined portion of the ileum was normal.  Recommendation:      - Discharge patient to home.                       - Await pathology results.                       - High fiber diet.                       - Use fiber, for example Citrucel, Fibercon, Konsyl                        or Metamucil.                       - Take a PROBIOTIC, such as a carton of GREEK YOGURT                        (Chobani or Oikos, or Activia or Dannon); or tablets                        of ALIGN or CULTURELLE or ADAM-Q (all                        non-prescription), every day for a month.                       - Miralax 1 capful (17 grams) in 8 ounces of water                        PO daily.                       - Call the [facility] in 2 weeks for reports (if                        you haven't heard from us sooner) [phone].                       - Repeat colonoscopy in 5 years for surveillance.                       - Continue present  medications.                       - Patient has a contact number available for                        emergencies. The signs and symptoms of potential                        delayed complications were discussed with the                        patient. Return to normal activities tomorrow.                        Written discharge instructions were provided to the                        patient.                       - Return to normal activities tomorrow.  Leighton Pickett MD  2/26/2019  Disposition: HOME OR SELF CARE    Patient Instructions:   Current Discharge Medication List      CONTINUE these medications which have NOT CHANGED    Details   allopurinol (ZYLOPRIM) 300 MG tablet Take 1 tablet (300 mg total) by mouth once daily.  Qty: 90 tablet, Refills: 3      ascorbic acid (VITAMIN C) 1000 MG tablet Take 1,000 mg by mouth once daily.       aspirin (ECOTRIN) 81 MG EC tablet Take 81 mg by mouth once a week.       atenolol (TENORMIN) 100 MG tablet Take 1 tablet (100 mg total) by mouth once daily.  Qty: 90 tablet, Refills: 3      atorvastatin (LIPITOR) 10 MG tablet TAKE ONE TABLET BY MOUTH ONCE DAILY.  Qty: 90 tablet, Refills: 3      baclofen (LIORESAL) 10 MG tablet Take 1 tablet (10 mg total) by mouth every evening.  Qty: 20 tablet, Refills: 1    Associated Diagnoses: Lumbar disc disease; Strain of lumbar region, initial encounter      calcitRIOL (ROCALTROL) 0.25 MCG Cap Take 0.25 mcg by mouth. Monday through Friday.  Refills: 4      cholecalciferol, vitamin D3, (VITAMIN D3) 2,000 unit Cap Take 1 capsule by mouth once daily. Mon - Fri      colchicine 0.6 mg tablet Take 1 tablet (0.6 mg total) by mouth 2 (two) times daily as needed.  Qty: 60 tablet, Refills: 5      dicyclomine (BENTYL) 10 MG capsule TAKE 1 OR 2 CAPSULES BY MOUTH UP TO FOUR TIMES DAILY USUALLY BEFORE MEALS AND AT BEDTIME TO EASE BLOATING AND GAS PAINS  Qty: 120 capsule, Refills: 11      diltiaZEM (CARDIZEM CD) 180 MG 24 hr capsule Take 180 mg by  mouth nightly.  Refills: 3      ferrous sulfate 325 mg (65 mg iron) Tab Take 1 tablet by mouth. Pt takes 2 times a week      furosemide (LASIX) 80 MG tablet TAKE 1 TABLET (80 MG TOTAL) BY MOUTH 2 (TWO) TIMES DAILY.  Qty: 60 tablet, Refills: 11    Associated Diagnoses: Edema, unspecified type      gabapentin (NEURONTIN) 600 MG tablet TAKE 1 TABLET (600 MG TOTAL) BY MOUTH 3 (THREE) TIMES DAILY.  Qty: 270 tablet, Refills: 2      levothyroxine (SYNTHROID) 112 MCG tablet TAKE ONE TABLET BY MOUTH ONCE DAILY.  Qty: 30 tablet, Refills: 12      meclizine (ANTIVERT) 25 mg tablet Take 1 tablet (25 mg total) by mouth 3 (three) times daily as needed.  Qty: 40 tablet, Refills: 2    Associated Diagnoses: Vertigo      multivitamin (MULTIVITAMIN) per tablet Take 1 tablet by mouth once daily.        omeprazole (PRILOSEC) 40 MG capsule TAKE 1 CAPSULE (40 MG TOTAL) BY MOUTH ONCE DAILY.  Qty: 90 capsule, Refills: 1    Associated Diagnoses: Gastroesophageal reflux disease, esophagitis presence not specified      !! oxyCODONE-acetaminophen (PERCOCET)  mg per tablet Take 1 tablet by mouth every 12 (twelve) hours as needed for Pain.  Qty: 50 tablet, Refills: 0    Associated Diagnoses: Lumbar disc disease; Cervical disc disease      polyethylene glycol (GLYCOLAX) 17 gram/dose powder MIX AND TAKE 17 GRAMS BY MOUTH ONCE DAILY.  Qty: 527 g, Refills: 1    Associated Diagnoses: History of chronic constipation; LLQ pain      potassium chloride SA (K-DUR,KLOR-CON) 20 MEQ tablet Take 1 tablet (20 mEq total) by mouth 2 (two) times daily.  Qty: 180 tablet, Refills: 2      ranitidine (ZANTAC) 300 MG capsule Take 1 capsule (300 mg total) by mouth nightly. , about 1 hour before bedtime.  Qty: 30 capsule, Refills: 6    Associated Diagnoses: Dysphagia; GERD (gastroesophageal reflux disease)      TENS unit and electrodes Cmpk Apply daily prn neck and back pain  Qty: 1 each, Refills: 0    Associated Diagnoses: Lumbar disc disease; Cervical disc disease       valACYclovir (VALTREX) 1000 MG tablet TAKE ONE TABLET BY MOUTH THREE TIMES A DAY  Qty: 21 tablet, Refills: 7      hydrocortisone (ANUSOL-HC) 25 mg suppository UNWRAP AND INSERT 1 SUPPOSITORY RECTALLY 2 (TWO) TIMES DAILY AS NEEDED FOR HEMORRHOIDS.  Refills: 2      LORazepam (ATIVAN) 0.5 MG tablet TAKE 1 TABLET (0.5 MG TOTAL) BY MOUTH EVERY 8 (EIGHT) HOURS AS NEEDED FOR ANXIETY.  Qty: 40 tablet, Refills: 1    Comments: Not to exceed 4 additional fills before 01/22/2019  Associated Diagnoses: Anxiety      !! oxyCODONE-acetaminophen (PERCOCET)  mg per tablet Take 1 tablet by mouth every 12 (twelve) hours as needed.  Qty: 60 tablet, Refills: 0      triamcinolone acetonide 0.1% (KENALOG) 0.1 % cream 2 (two) times daily.  Refills: 5       !! - Potential duplicate medications found. Please discuss with provider.          Discharge Procedure Orders (must include Diet, Follow-up, Activity)    Follow Up:  Follow up with PCP as per your routine.  Please follow a high fiber diet and take fiber supplement.  Activity as tolerated.    No driving day of procedure.    PROBIOTICS:  Now that your colon is so cleaned out, now is a good time for a round of PROBIOTICS.  Eat a container of Greek Yogurt, such as OIKOS or CHOBANI,  Or Activia or Dannon    Greek Yogurt.    Or Take a similar Probiotic product such as Align or Culturelle or June-Q, every day for a month.                  (The products listed are non-prescription, but you may need to ask the pharmacist for their location.)  Repeat this at least 5-6  times a year.

## 2019-02-26 NOTE — [employer] PATIENT INSTRUCTIONS
Discharge Summary/Instructions for after Colonoscopy with   Biopsy/Polypectomy  Darby Rodriguez    Tuesday, February 26, 2019  Leighton Pickett MD  RESTRICTIONS ON ACTIVITY:  - Do not drive a car or operate machinery until the day after the procedure.      - The following day: return to full activity including work.  - For  3 days: No heavy lifting, straining or running.  - Diet: You can have solid foods, but no gassy foods (i.e. beans, broccoli,   cabbage, etc).  TREATMENT FOR COMMON SIDE EFFECTS:  - Mild abdominal pain and bloating or excessive gas: rest, eat lightly and   use a heating pad.  SYMPTOMS TO WATCH FOR AND REPORT TO YOUR PHYSICIAN:  1. Severe abdominal pain.  2. Fever within 24 hours after a procedure.  3. A large amount of rectal bleeding. (A small amount of blood from the   rectum is not serious, especially if hemorrhoids are present.  3.  Because air was put into your colon during the procedure, expelling   large amounts of air from your rectum is normal.  4.  You may not have a bowel movement for 1-3 days because of the   colonoscopy prep.  This is normal.  5.  Call immediately if you notice any of the following:   Chills and/or fever over 101   Persistent vomiting   Severe abdominal pain, other than gas cramps   Severe chest pain   Black, tarry stools   Any bleeding - exceeding one tablespoon  Your doctor recommends these additional instructions:  You are being discharged to home.   We are waiting for your pathology results.   Eat a high fiber diet.   Take a fiber supplement, for example Citrucel, Fibercon, Konsyl or   Metamucil.   Take Miralax 1 capful (17 grams) in 8 ounces of water by mouth once a day.   Your physician has recommended a repeat colonoscopy in five years for   surveillance.   Take a PROBIOTIC, such as a carton of GREEK YOGURT (Chobani or Oikos,  or   Activia or Dannon);  or tablets of ALIGN or CULTURELLE or ADAM-Q (all   non-prescription), every day for a month.   And repeat  this at least 5-6   times a year.  None  If you have any questions or problems, please call your physician.  EMERGENCY PHONE NUMBER: [phone]  LAB RESULTS: Call in two (2) weeks for lab results, [phone]  ___________________________________________  Nurse Signature  ___________________________________________  Patient/Designated Responsible Party Signature  Leighton Pickett MD  2/26/2019 11:28:59 AM  This report has been verified and signed electronically.  [employer]

## 2019-02-26 NOTE — H&P
History & Physical - Short Stay  Gastroenterology      SUBJECTIVE:     Procedure: Colonoscopy    Chief Complaint/Indication for Procedure: LLQ pain, constipation.  Hx of colon polyps.  FHx of colon cancer (father).    History of Present Illness:    Office Visit     1/10/2019  [facility] Gastroenterology      Lani Elias, [facility]   Gastroenterology   LLQ pain +6 more   Dx   Abdominal Pain     Reason for Visit    Progress Notes        Subjective:       Patient ID: Darby Rodriguez is a 73 y.o. female Body mass index is 30.32 kg/m².     Chief Complaint: Abdominal Pain (LLQ pain, constipation)     Established patient of Dr. Pickett & myself.     Abdominal Pain   This is a recurrent problem. Episode onset: recurred 12/2018, has had similar pain in past. The problem occurs daily. Duration: lasts a few minutes. The problem has been unchanged. The pain is located in the LLQ. The pain is at a severity of 6/10. The quality of the pain is sharp (soreness). The abdominal pain does not radiate. Associated symptoms include belching (not more than usual), constipation (chronic occasional; bowel movements are 3-4 times a week; increased fiber in diet recently; miralax prn- does better when on daily) and flatus (frequent). Pertinent negatives include no anorexia, diarrhea, dysuria, fever, frequency, hematochezia, melena, nausea, vomiting or weight loss (gained weight recently). Exacerbated by: stress, bending forwarding. Relieved by: tylenol. She has tried proton pump inhibitors and H2 blockers (bentyl 10 mg TID, prilosec 40 mg once daily, zantac 300 mg once daily; glycolax daily PRN- helps, anusol SD PRN) for the symptoms. The treatment provided moderate relief. Prior diagnostic workup includes CT scan. Her past medical history is significant for GERD (controlled overall with medications). There is no history of colon cancer, Crohn's disease, pancreatitis, PUD or ulcerative colitis.      Gastrointestinal: Positive for abdominal  "pain, constipation (chronic occasional; bowel movements are 3-4 times a week; increased fiber in diet recently; miralax prn- does better when on daily) and flatus (frequent). Negative for anal bleeding, anorexia, blood in stool, diarrhea, hematochezia, melena, nausea, rectal pain and vomiting.     3/4/15 Colonoscopy was reviewed and procedure report states:   "Impression:          - One 1 mm polyp in the cecum. Resected and                        retrieved.                       - Diverticulosis in the sigmoid colon.                       - Mild colonic spasm consistent with irritable bowel                        syndrome.                       - Diverticulosis in the descending colon, in the                        transverse colon and at the hepatic flexure.                       - Internal hemorrhoids.                       - Hemorrhagic mucosa in the proximal ascending                        colon, due to barotrauma. Biopsied.                       - The examination was otherwise normal.                       - The examined portion of the ileum was normal.  Recommendation:      - Discharge patient to home.                       - Await pathology results.                       - Repeat colonoscopy in 5 years for surveillance.                       - High fiber diet.                       - Use fiber, for example Citrucel, Fibercon, Konsyl                        or Metamucil.                       - Take a PROBIOTIC, such as ALIGN or CULTURELLE or                        ADAM-Q (all non-prescription), every day for a                        month.                       - Continue present medications.                       - Use Bentyl (dicyclomine) 10 mg PO QID 30 min AC.                       - Call the G.I. clinic in 2 weeks for reports (if                        you haven't heard from us sooner) [phone].                       - Return to primary care physician.                       - Return to GI clinic " "PRN.                       - Patient has a contact number available for                        emergencies. The signs and symptoms of potential                        delayed complications were discussed with the                        patient. Return to normal activities tomorrow.                        Written discharge instructions were provided to the                        patient.                       - Return to normal activities tomorrow.".  Biopsy results:   "1-3. FRAGMENTS OF NONNEOPLASTIC COLONIC MUCOSA WITH NO ACTIVE INFLAMMATION,  ULCERATION OR DYSPLASIA IDENTIFIED."    Assessment:       1. LLQ pain    2. Opioid use    3. History of colon polyps    4. Elevated ALT measurement    5. History of chronic constipation    6. History of diverticulosis    7. Family history of colon cancer        Plan:       LLQ pain  -     CT Abdomen Pelvis With Contrast; Future; Expected date: 01/10/2019  -     CBC auto differential; Future; Expected date: 01/10/2019  -     Comprehensive metabolic panel; Future; Expected date: 01/10/2019  - CONTINUE BENTYL 10 MG QID PRN AS DIRECTED  - schedule Colonoscopy to be done in 4-6 weeks, discussed procedure with the patient, patient verbalized understanding     History of chronic constipation & Opioid use  -     CT Abdomen Pelvis With Contrast; Future; Expected date: 01/10/2019  Recommended daily exercise as tolerated, adequate water intake (six 8-oz glasses of water daily), and high fiber diet. OTC fiber supplements are recommended if diet does not reach daily fiber goal (25 grams daily), such as Metamucil, Citrucel, or FiberCon (take as directed, separate from other oral medications by >2 hours).  -Recommend taking an OTC stool softener such as Colace as directed to avoid hard stools and straining with bowel movements PRN  -  REFILL   polyethylene glycol (GLYCOLAX) 17 gram/dose powder; MIX AND TAKE 17 GRAMS BY MOUTH ONCE DAILY.  Dispense: 527 g; Refill: 1  - If no improvement with " above recommendations, try intermittently dosed Dulcolax OTC as directed (every 3-4  days) PRN to facilitate bowel movements  -If still no improvement with these measures, call/follow-up  - discussed with patient that a side effect of narcotic pain medications is constipation, advised patient to talk to provider who manages pain medication and to try to stop or decrease use of narcotics, patient verbalized understanding  - schedule Colonoscopy to be done in 4-6 weeks, discussed procedure with the patient, patient verbalized understanding     History of colon polyps & Family history of colon cancer  - schedule Colonoscopy to be done in 4-6 weeks, discussed procedure with the patient, patient verbalized understanding     Elevated ALT measurement  -     Comprehensive metabolic panel; Future; Expected date: 01/10/2019  - minimize/avoid alcohol and tylenol products, & follow-up with PCP for continued evaluation and management; if specialist is needed, recommend seeing hepatology.     History of diverticulosis  - discussed the diagnosis of diverticulosis and diverticulitis, to prevent diverticulitis, high fiber diet is recommended  - discussed about antibiotic treatment, patient reports she would like to wait for ct scan results to see if antibiotics are indicated  -Recommended high fiber diet after episode has completely resolved. Recommended daily exercise, adequate water intake (six 8-oz glasses of water daily), and high fiber diet. OTC fiber supplements are recommended if diet does not reach daily fiber goal (25 grams daily), such as Metamucil, Citrucel, or FiberCon (take as directed, separate from other oral medications by >2 hours).  - Advised to avoid/minimize popcorn, corn, seeds, and nuts.     Follow-up in about 1 month (around 2/10/2019), or if symptoms worsen or fail to improve.    If no improvement in symptoms or symptoms worsen, call/follow-up at clinic or go to ER.            CT Scan 1/10/2019  Impression        1. Diverticulosis coli.  No evidence of acute diverticulitis.  2. Fat containing umbilical hernia.  3. Normal appearance of the appendix.  4. Small hiatal hernia and possible gastroesophageal reflux.  There is a suggestion of gastric fold thickening versus nondistention involving the proximal stomach.  Please correlate clinically and consider additional evaluation with endoscopy as deemed clinically appropriate.  5. Multilevel degenerative change of the lumbar spine.      Electronically signed by: Jesus Wren MD  Date: 01/14/2019         PTA Medications   Medication Sig    allopurinol (ZYLOPRIM) 300 MG tablet Take 1 tablet (300 mg total) by mouth once daily.    ascorbic acid (VITAMIN C) 1000 MG tablet Take 1,000 mg by mouth once daily.     aspirin (ECOTRIN) 81 MG EC tablet Take 81 mg by mouth once a week.     atenolol (TENORMIN) 100 MG tablet Take 1 tablet (100 mg total) by mouth once daily.    atorvastatin (LIPITOR) 10 MG tablet TAKE ONE TABLET BY MOUTH ONCE DAILY.    baclofen (LIORESAL) 10 MG tablet Take 1 tablet (10 mg total) by mouth every evening.    calcitRIOL (ROCALTROL) 0.25 MCG Cap Take 0.25 mcg by mouth. Monday through Friday.    cholecalciferol, vitamin D3, (VITAMIN D3) 2,000 unit Cap Take 1 capsule by mouth once daily. Mon - Fri    colchicine 0.6 mg tablet Take 1 tablet (0.6 mg total) by mouth 2 (two) times daily as needed.    dicyclomine (BENTYL) 10 MG capsule TAKE 1 OR 2 CAPSULES BY MOUTH UP TO FOUR TIMES DAILY USUALLY BEFORE MEALS AND AT BEDTIME TO EASE BLOATING AND GAS PAINS    diltiaZEM (CARDIZEM CD) 180 MG 24 hr capsule Take 180 mg by mouth nightly.    ferrous sulfate 325 mg (65 mg iron) Tab Take 1 tablet by mouth. Pt takes 2 times a week    furosemide (LASIX) 80 MG tablet TAKE 1 TABLET (80 MG TOTAL) BY MOUTH 2 (TWO) TIMES DAILY.    gabapentin (NEURONTIN) 600 MG tablet TAKE 1 TABLET (600 MG TOTAL) BY MOUTH 3 (THREE) TIMES DAILY.    levothyroxine (SYNTHROID) 112 MCG tablet TAKE  ONE TABLET BY MOUTH ONCE DAILY.    meclizine (ANTIVERT) 25 mg tablet Take 1 tablet (25 mg total) by mouth 3 (three) times daily as needed.    multivitamin (MULTIVITAMIN) per tablet Take 1 tablet by mouth once daily.      omeprazole (PRILOSEC) 40 MG capsule TAKE 1 CAPSULE (40 MG TOTAL) BY MOUTH ONCE DAILY.    oxyCODONE-acetaminophen (PERCOCET)  mg per tablet Take 1 tablet by mouth every 12 (twelve) hours as needed for Pain.    polyethylene glycol (GLYCOLAX) 17 gram/dose powder MIX AND TAKE 17 GRAMS BY MOUTH ONCE DAILY.    potassium chloride SA (K-DUR,KLOR-CON) 20 MEQ tablet Take 1 tablet (20 mEq total) by mouth 2 (two) times daily.    ranitidine (ZANTAC) 300 MG capsule Take 1 capsule (300 mg total) by mouth nightly. , about 1 hour before bedtime.    TENS unit and electrodes Cmpk Apply daily prn neck and back pain    valACYclovir (VALTREX) 1000 MG tablet TAKE ONE TABLET BY MOUTH THREE TIMES A DAY    hydrocortisone (ANUSOL-HC) 25 mg suppository UNWRAP AND INSERT 1 SUPPOSITORY RECTALLY 2 (TWO) TIMES DAILY AS NEEDED FOR HEMORRHOIDS.    LORazepam (ATIVAN) 0.5 MG tablet TAKE 1 TABLET (0.5 MG TOTAL) BY MOUTH EVERY 8 (EIGHT) HOURS AS NEEDED FOR ANXIETY.    oxyCODONE-acetaminophen (PERCOCET)  mg per tablet Take 1 tablet by mouth every 12 (twelve) hours as needed.    triamcinolone acetonide 0.1% (KENALOG) 0.1 % cream 2 (two) times daily.       Review of patient's allergies indicates:   Allergen Reactions    Iodine      Blurred vision on 2 occasions.    Meperidine      Other reaction(s): hyperventalating  Other reaction(s): blurred vision    Penicillins     Promethazine      Other reaction(s): blurred vision        Past Medical History:   Diagnosis Date    Allergy     Anemia     Arthritis     Atrial fibrillation     Cervical disc disease     Colon polyp     Degenerative joint disease (DJD) of hip     (L); last injected 4/1/15    Diverticulosis     DJD (degenerative joint disease) of knee   "   left; last injected 4/1/15    Encounter for blood transfusion     GERD (gastroesophageal reflux disease)     Gout     Headache(784.0)     HTN (hypertension)     Hypothyroidism (acquired)     IBS (irritable bowel syndrome) 7/29/2013    Lumbar disc disease     Mixed hyperlipidemia     PONV (postoperative nausea and vomiting)     Renal insufficiency     Dr. Greene    Sleep apnea     wears cPAP     Past Surgical History:   Procedure Laterality Date    COLONOSCOPY  2010?  [facility]ro    (diverticulosis?)    COLONOSCOPY  03/2015    Dr. Pickett, repeat in 5 years for surveillance    COLONOSCOPY N/A 3/4/2015    [facility]    COLONOSCOPY N/A 6/17/2013    Per[facility] at [facility]    COLONOSCOPY W/ POLYPECTOMY  11/12/2010  Ferro    Two 5 mm polyps in the sigmoid colon.  Diverticulosis (sigmoid, descending, and transverse colon).  Melanosis.    COLONOSCOPY W/ POLYPECTOMY  6/17/13  [facility]    One 1 mm polyp in the distal sigmoid colon.  TUBULAR ADENOMA.  Diverticulosis in the sigmoid colon.  Moderate colonic spasm consistent with IBS.  Redundant colon.  Melanosis in the colon.    ESOPHAGOGASTRODUODENOSCOPY  4/27/2012  [facility]ro    Normal esophagus.  Small hiatal hernia.  Antrum erythema.  REACTIVE/CHEMICAL GASTROPATHY.  NO HELICOBACTER SEEN.    ESOPHAGOGASTRODUODENOSCOPY (EGD) N/A 1/5/2015    Per[facility]on Pickett Jr[facility] at [facility]    ESOPHAGOSCOPY W/ DILATION  1/5/2015  [facility]    Benign-appearing esophageal stricture or "ring," dilated 54 Fr.  Reflux esophagitis. (NERD).  Hiatus hernia.  Erythematous mucosa in the antrum.  NONNEOPLASTIC GASTRIC MUCOSA SHOWING REACTIVE/REPARATIVE CHANGES.  CECY Test  Negative.    HYSTERECTOMY  1980    Partial- one ovary remains- patient believes the right one remains    JOINT REPLACEMENT  4/15/11    (R) TKA    JOINT REPLACEMENT  8/19/09    (R)PAVAN    KNEE ARTHROSCOPY      (R) knee    RADIOFREQUENCY ABLATION " "Right 10/20/2014    UPPER GASTROINTESTINAL ENDOSCOPY  01/2015    Dr. Pickett     Family History   Problem Relation Age of Onset    Heart disease Sister 50        mi    Ovarian cancer Sister 53    Diabetes Mother     Colon cancer Father 65    Breast cancer Neg Hx     Crohn's disease Neg Hx     Ulcerative colitis Neg Hx      Social History     Tobacco Use    Smoking status: Never Smoker    Smokeless tobacco: Never Used   Substance Use Topics    Alcohol use: No    Drug use: Yes     Types: Oxycodone         OBJECTIVE:     Vital Signs (Most Recent)  Temp: 97.7 °F (36.5 °C) (02/26/19 1018)  Pulse: (!) 59 (02/26/19 1018)  Resp: 17 (02/26/19 1018)  BP: (!) 151/71 (02/26/19 1018)  SpO2: 98 % (02/26/19 1018)    Physical Exam:  :Ht 5' 11" (1.803 m)   Wt 98.6 kg (217 lb 6 oz)   BMI 30.32 kg/m²           GENERAL:  Comfortable, in no acute distress.                                 HEENT EXAM:  Nonicteric.  No adenopathy.  Oropharynx is clear.        NECK:  Supple.                                                               LUNGS:  Clear.                                                               CARDIAC:  Regular rate and rhythm.  S1, S2.  No murmur.                  ABDOMEN:  Soft, positive bowel sounds, nontender.  No hepatosplenomegaly or masses.  No rebound or guarding.                   EXTREMITIES:  No edema.     MENTAL STATUS:  Alert and oriented.    ASSESSMENT/PLAN:     Assessment:  LLQ pain, constipation.  Hx of colon polyps.  FHx of colon cancer (father).    Plan: Colonoscopy    Anesthesia Plan:   MAC / General Anaesthesia    ASA Grade: ASA 2 - Patient with mild systemic disease with no functional limitations    MALLAMPATI SCORE: II (hard and soft palate, upper portion of tonsils anduvula visible)    "

## 2019-02-26 NOTE — DISCHARGE INSTRUCTIONS
Recovery After Procedural Sedation (Adult)  You have been given medicine by vein to make you sleep during your surgery. This may have included both a pain medicine and sleeping medicine. Most of the effects have worn off. But you may still have some drowsiness for the next 6 to 8 hours.  Home care  Follow these guidelines when you get home:  · For the next 8 hours, you should be watched by a responsible adult. This person should make sure your condition is not getting worse.  · Don't drink any alcohol for the next 24 hours.  · Don't drive, operate dangerous machinery, or make important business or personal decisions during the next 24 hours.  Note: Your healthcare provider may tell you not to take any medicine by mouth for pain or sleep in the next 4 hours. These medicines may react with the medicines you were given in the hospital. This could cause a much stronger response than usual.  Follow-up care  Follow up with your healthcare provider if you are not alert and back to your usual level of activity within 12 hours.  When to seek medical advice  Call your healthcare provider right away if any of these occur:  · Drowsiness gets worse  · Weakness or dizziness gets worse  · Repeated vomiting  · You can't be awakened   Date Last Reviewed: 10/18/2016  © 1760-0160 [employer]. [address] [place] [address], [place] [address]. All rights reserved. This information is not intended as a substitute for professional medical care. Always follow your healthcare professional's instructions.      PROBIOTICS:  Now that your colon is so cleaned out, now is a good time for a round of PROBIOTICS.  Eat a container of Greek Yogurt, such as OIKOS or CHOBANI,  Or Activia or Dannon    Greek Yogurt.    Or Take a similar Probiotic product such as Align or Culturelle or June-Q, every day for a month.                  (The products listed are non-prescription, but you may need to ask the pharmacist for their location.)  Repeat  this at least 5-6  times a year.        High-Fiber Diet  Fiber is in fruits, vegetables, cereals, and grains. Fiber passes through your body undigested. A high-fiber diet helps food move through your intestinal tract. The added bulk is helpful in preventing constipation. In people with diverticulosis, fiber helps clean out the pouches along the colon wall. It also prevents new pouches from forming. A high-fiber diet reduces the risk of colon cancer. It also lowers blood cholesterol and prevents high blood sugar in people with diabetes.    The fiber-rich foods listed below should be part of your diet. If you are not used to high-fiber foods, start with 1 or 2 foods from this list. Every 3 to 4 days add a new one to your diet. Do this until you are eating 4 high-fiber foods per day. This should give you 20 to 35 grams of fiber a day. It is also important to drink a lot of water when you are on this diet. You should have 6 to 8 glasses of water a day. Water makes the fiber swell and increases the benefit.  Foods high in dietary fiber  The following foods are high in dietary fiber:  · Breads. Breads made with 100% whole-wheat flour; klaudia, wheat, or rye crackers; whole-grain tortillas, bran muffins.  · Cereals. Whole-grain and bran cereals with bran (shredded wheat, wheat flakes, raisin bran, corn bran); oatmeal, rolled oats, granola, and brown rice.  · Fruits. Fresh fruits and their edible skins (pears, prunes, raisins, berries, apples, and apricots); bananas, citrus fruit, mangoes, pineapple; and prune juice.  · Nuts. Any nuts and seeds.  · Vegetables. Best served raw or lightly cooked. All types, especially: green peas, celery, eggplant, potatoes, spinach, broccoli, [place] sprouts, winter squash, carrots, cauliflower, soybeans, lentils, and fresh and dried beans of all kinds.  · Other. Popcorn, any spices.  Date Last Reviewed: 8/1/2016  © 0177-8938 [employer]. [address] [place] [address], [place]  [address]. All rights reserved. This information is not intended as a substitute for professional medical care. Always follow your healthcare professional's instructions.

## 2019-02-26 NOTE — ANESTHESIA PREPROCEDURE EVALUATION
02/26/2019  Darby Rodriguez is a 73 y.o., female.    Pre-op Assessment    I have reviewed the Patient Summary Reports.     I have reviewed the Nursing Notes.   I have reviewed the Medications.     Review of Systems  Anesthesia Hx:  Hx of Anesthetic complications  Denies Family Hx of Anesthesia complications.  Personal Hx of Anesthesia complications, Post-Operative Nausea/Vomiting   Social:  Non-Smoker    Cardiovascular:   Hypertension hyperlipidemia    Renal/:   Chronic Renal Disease, CRI    Hepatic/GI:   GERD    Musculoskeletal:   Arthritis  gout   Neurological:   Headaches    Endocrine:   Hypothyroidism    Psych:   Psychiatric History anxiety depression          Physical Exam  General:  Obesity    Airway/Jaw/Neck:  Airway Findings: Mouth Opening: Normal Tongue: Normal  Mallampati: II  TM Distance: Normal, at least 6 cm       Chest/Lungs:  Chest/Lungs Findings: Clear to auscultation     Heart/Vascular:  Heart Findings: Rate: Normal        Mental Status:  Mental Status Findings:  Cooperative, Alert and Oriented         Anesthesia Plan  Type of Anesthesia, risks & benefits discussed:  Anesthesia Type:  general  Patient's Preference:   Intra-op Monitoring Plan:   Intra-op Monitoring Plan Comments:   Post Op Pain Control Plan:   Post Op Pain Control Plan Comments:   Induction:   IV  Beta Blocker:         Informed Consent: Patient understands risks and agrees with Anesthesia plan.  Questions answered. Anesthesia consent signed with patient.  ASA Score: 3     Day of Surgery Review of History & Physical: I have interviewed and examined the patient. I have reviewed the patient's H&P dated:  There are no significant changes.          Ready For Surgery From Anesthesia Perspective.

## 2019-04-22 ENCOUNTER — TELEPHONE (OUTPATIENT)
Dept: PODIATRY | Facility: CLINIC | Age: 74
End: 2019-04-22

## 2019-04-22 NOTE — TELEPHONE ENCOUNTER
Spoke with patient let her know we did not have any appts sooner the May 1st for her and her  to be seen. Offered her an appt tomorrow at 0700am with Dr. Mccabe for a dark colored toe, she declined appt and stated she would wait until May 1st to be seen and would go to the ER if it go worse. She voiced all understanding.

## 2019-04-22 NOTE — TELEPHONE ENCOUNTER
----- Message from Lara Knowles sent at 4/22/2019 10:00 AM CDT -----  Contact: self  Patient [phone] is calling requesting an appt this week/lives in [place]/is a diabetic/I do not show any available appts until 04 30 19/second toe next to big toe on right foot is painful and top portion is discolored/please advise

## 2019-05-01 ENCOUNTER — OFFICE VISIT (OUTPATIENT)
Dept: PODIATRY | Facility: CLINIC | Age: 74
End: 2019-05-01
Payer: MEDICARE

## 2019-05-01 VITALS — HEIGHT: 70 IN | BODY MASS INDEX: 30.35 KG/M2 | WEIGHT: 212 LBS | RESPIRATION RATE: 20 BRPM

## 2019-05-01 DIAGNOSIS — M20.12 VALGUS DEFORMITY OF BOTH GREAT TOES: ICD-10-CM

## 2019-05-01 DIAGNOSIS — M20.41 HAMMER TOES OF BOTH FEET: ICD-10-CM

## 2019-05-01 DIAGNOSIS — B35.1 ONYCHOMYCOSIS DUE TO DERMATOPHYTE: ICD-10-CM

## 2019-05-01 DIAGNOSIS — G60.9 IDIOPATHIC PERIPHERAL NEUROPATHY: Primary | ICD-10-CM

## 2019-05-01 DIAGNOSIS — L84 CORN OR CALLUS: ICD-10-CM

## 2019-05-01 DIAGNOSIS — M20.11 VALGUS DEFORMITY OF BOTH GREAT TOES: ICD-10-CM

## 2019-05-01 DIAGNOSIS — M20.42 HAMMER TOES OF BOTH FEET: ICD-10-CM

## 2019-05-01 PROCEDURE — 99214 OFFICE O/P EST MOD 30 MIN: CPT | Mod: PBBFAC,PN | Performed by: PODIATRIST

## 2019-05-01 PROCEDURE — 99999 PR PBB SHADOW E&M-EST. PATIENT-LVL IV: ICD-10-PCS | Mod: PBBFAC,,, | Performed by: PODIATRIST

## 2019-05-01 PROCEDURE — 11721 DEBRIDE NAIL 6 OR MORE: CPT | Mod: S$PBB,59,Q9, | Performed by: PODIATRIST

## 2019-05-01 PROCEDURE — 11721 DEBRIDE NAIL 6 OR MORE: CPT | Mod: PBBFAC,PN | Performed by: PODIATRIST

## 2019-05-01 PROCEDURE — 11721 PR DEBRIDEMENT OF NAILS, 6 OR MORE: ICD-10-PCS | Mod: S$PBB,59,Q9, | Performed by: PODIATRIST

## 2019-05-01 PROCEDURE — 11056 PARNG/CUTG B9 HYPRKR LES 2-4: CPT | Mod: PBBFAC,PN | Performed by: PODIATRIST

## 2019-05-01 PROCEDURE — 11056 PR TRIM BENIGN HYPERKERATOTIC SKIN LESION,2-4: ICD-10-PCS | Mod: S$PBB,Q9,, | Performed by: PODIATRIST

## 2019-05-01 PROCEDURE — 99999 PR PBB SHADOW E&M-EST. PATIENT-LVL IV: CPT | Mod: PBBFAC,,, | Performed by: PODIATRIST

## 2019-05-01 PROCEDURE — 99213 OFFICE O/P EST LOW 20 MIN: CPT | Mod: 25,S$PBB,, | Performed by: PODIATRIST

## 2019-05-01 PROCEDURE — 99213 PR OFFICE/OUTPT VISIT, EST, LEVL III, 20-29 MIN: ICD-10-PCS | Mod: 25,S$PBB,, | Performed by: PODIATRIST

## 2019-05-01 PROCEDURE — 11056 PARNG/CUTG B9 HYPRKR LES 2-4: CPT | Mod: S$PBB,Q9,, | Performed by: PODIATRIST

## 2019-05-01 RX ORDER — AMOXICILLIN 500 MG/1
CAPSULE ORAL
Refills: 0 | COMMUNITY
Start: 2019-04-24 | End: 2019-06-25 | Stop reason: ALTCHOICE

## 2019-05-02 NOTE — PROGRESS NOTES
"Subjective:      Patient ID: Dabry Rodriguez is a 74 y.o. female.    Chief Complaint: Diabetes Mellitus (PCP: Santosh seen 4/3/19 with A1C: 5.9 on 11/30/18) and Diabetic Foot Exam (3 month follow up )    Darby is a 74 y.o. female who presents to the clinic for evaluation and treatment of high risk feet. Darby has a past medical history of Allergy, Anemia, Arthritis, Atrial fibrillation, Cervical disc disease, Colon polyp, Degenerative joint disease (DJD) of hip, Diverticulosis, DJD (degenerative joint disease) of knee, Encounter for blood transfusion, GERD (gastroesophageal reflux disease), Gout, Headache(784.0), HTN (hypertension), Hypothyroidism (acquired), IBS (irritable bowel syndrome) (7/29/2013), Lumbar disc disease, Mixed hyperlipidemia, PONV (postoperative nausea and vomiting), Renal insufficiency, and Sleep apnea. The patient's chief complaint is toenails in need of trimming.  Denies being painful with wearing shoe gear.  Has not attempted to self treat.  Also, inquires as to the causative agent responsible for "darkening" of the skin of the Rt. 2nd toe.  Suspects this is secondary to pressure from shoe gear, on account of the hallux displacing the toe dorsally.  Inquires as to how this issue can be resolved.   Denies any additional pedal complaints.       PCP: Jose Frost MD    Last visit: 4/3/19  Hemoglobin A1C   Date Value Ref Range Status   11/30/2018 5.9 (H) 0.0 - 5.6 % Final     Comment:     Reference Interval:  5.0 - 5.6 Normal   5.7 - 6.4 High Risk   > 6.5 Diabetic    Hgb A1c results are standardized based on the (NGSP) National   Glycohemoglobin Standardization Program.    Hemoglobin A1C levels are related to mean serum/plasma glucose   during the preceding 2-3 months.        02/09/2018 6.0 (H) 0.0 - 5.6 % Final     Comment:     Reference Interval:  5.0 - 5.6 Normal   5.7 - 6.4 High Risk   > 6.5 Diabetic    Hgb A1c results are standardized based on the (NGSP) National   Glycohemoglobin Standardization " Program.    Hemoglobin A1C levels are related to mean serum/plasma glucose   during the preceding 2-3 months.        08/07/2014 5.7 4.5 - 6.2 % Final       Past Medical History:   Diagnosis Date    Allergy     Anemia     Arthritis     Atrial fibrillation     Cervical disc disease     Colon polyp     Degenerative joint disease (DJD) of hip     (L); last injected 4/1/15    Diverticulosis     DJD (degenerative joint disease) of knee     left; last injected 4/1/15    Encounter for blood transfusion     GERD (gastroesophageal reflux disease)     Gout     Headache(784.0)     HTN (hypertension)     Hypothyroidism (acquired)     IBS (irritable bowel syndrome) 7/29/2013    Lumbar disc disease     Mixed hyperlipidemia     PONV (postoperative nausea and vomiting)     Renal insufficiency     Dr. Greene    Sleep apnea     wears cPAP       Past Surgical History:   Procedure Laterality Date    COLONOSCOPY  2010?  Ferro    (diverticulosis?)    COLONOSCOPY  03/2015    Dr. Pickett, repeat in 5 years for surveillance    COLONOSCOPY N/A 2/26/2019    [facility]    COLONOSCOPY N/A 3/4/2015    Per[facility]    COLONOSCOPY N/A 6/17/2013    [facility]    COLONOSCOPY W/ POLYPECTOMY  11/12/2010  Ferro    Two 5 mm polyps in the sigmoid colon.  Diverticulosis (sigmoid, descending, and transverse colon).  Melanosis.    COLONOSCOPY W/ POLYPECTOMY  6/17/13  Piyush    One 1 mm polyp in the distal sigmoid colon.  TUBULAR ADENOMA.  Diverticulosis in the sigmoid colon.  Moderate colonic spasm consistent with IBS.  Redundant colon.  Melanosis in the colon.    ESOPHAGOGASTRODUODENOSCOPY  4/27/2012  Ferro    Normal esophagus.  Small hiatal hernia.  Antrum erythema.  REACTIVE/CHEMICAL GASTROPATHY.  NO HELICOBACTER SEEN.    ESOPHAGOGASTRODUODENOSCOPY (EGD) N/A 1/5/2015    Performed by Leighton Pickett Jr., MD at  "[facility]    ESOPHAGOSCOPY W/ DILATION  1/5/2015  Piyush    Benign-appearing esophageal stricture or "ring," dilated 54 Fr.  Reflux esophagitis. (NERD).  Hiatus hernia.  Erythematous mucosa in the antrum.  NONNEOPLASTIC GASTRIC MUCOSA SHOWING REACTIVE/REPARATIVE CHANGES.  CECY Test  Negative.    HYSTERECTOMY  1980    Partial- one ovary remains- patient believes the right one remains    JOINT REPLACEMENT  4/15/11    (R) TKA    JOINT REPLACEMENT  8/19/09    (R)PAVAN    KNEE ARTHROSCOPY      (R) knee    RADIOFREQUENCY ABLATION Right 10/20/2014    UPPER GASTROINTESTINAL ENDOSCOPY  01/2015    Dr. Pickett       Family History   Problem Relation Age of Onset    Heart disease Sister 50        mi    Ovarian cancer Sister 53    Diabetes Mother     Colon cancer Father 65    Breast cancer Neg Hx     Crohn's disease Neg Hx     Ulcerative colitis Neg Hx        Social History     Socioeconomic History    Marital status:      Spouse name: Not on file    Number of children: Not on file    Years of education: Not on file    Highest education level: Not on file   Occupational History    Not on file   Social Needs    Financial resource strain: Not on file    Food insecurity:     Worry: Not on file     Inability: Not on file    Transportation needs:     Medical: Not on file     Non-medical: Not on file   Tobacco Use    Smoking status: Never Smoker    Smokeless tobacco: Never Used   Substance and Sexual Activity    Alcohol use: No    Drug use: Yes     Types: Oxycodone    Sexual activity: Not on file   Lifestyle    Physical activity:     Days per week: Not on file     Minutes per session: Not on file    Stress: Not on file   Relationships    Social connections:     Talks on phone: Not on file     Gets together: Not on file     Attends [employer] witness service: Not on file     Active member of club or organization: Not on file     Attends meetings of clubs or organizations: Not on file     Relationship status: Not " on file   Other Topics Concern    Not on file   Social History Narrative    Not on file       Current Outpatient Medications   Medication Sig Dispense Refill    allopurinol (ZYLOPRIM) 300 MG tablet TAKE 1 TABLET BY MOUTH EVERY DAY 90 tablet 3    ascorbic acid (VITAMIN C) 1000 MG tablet Take 1,000 mg by mouth once daily.       aspirin (ECOTRIN) 81 MG EC tablet Take 81 mg by mouth once a week.       atenolol (TENORMIN) 100 MG tablet Take 1 tablet (100 mg total) by mouth once daily. 90 tablet 3    atorvastatin (LIPITOR) 10 MG tablet TAKE ONE TABLET BY MOUTH ONCE DAILY. 90 tablet 3    baclofen (LIORESAL) 10 MG tablet Take 1 tablet (10 mg total) by mouth every evening. 20 tablet 1    calcitRIOL (ROCALTROL) 0.25 MCG Cap Take 1 capsule (0.25 mcg total) by mouth once daily. Monday through Friday. 30 capsule 4    cholecalciferol, vitamin D3, (VITAMIN D3) 2,000 unit Cap Take 1 capsule by mouth once daily. Mon - Fri      colchicine (COLCRYS) 0.6 mg tablet Take 1 tablet (0.6 mg total) by mouth 2 (two) times daily as needed. 60 tablet 5    dicyclomine (BENTYL) 10 MG capsule TAKE 1 OR 2 CAPSULES BY MOUTH UP TO FOUR TIMES DAILY USUALLY BEFORE MEALS AND AT BEDTIME TO EASE BLOATING AND GAS PAINS 120 capsule 11    diltiaZEM (CARDIZEM CD) 180 MG 24 hr capsule Take 1 capsule (180 mg total) by mouth nightly. 90 capsule 0    diphenoxylate-atropine 2.5-0.025 mg (LOMOTIL) 2.5-0.025 mg per tablet Take 1 tablet by mouth 4 (four) times daily as needed for Diarrhea. 30 tablet 1    ferrous sulfate 325 mg (65 mg iron) Tab Take 1 tablet by mouth. Pt takes 2 times a week      gabapentin (NEURONTIN) 600 MG tablet TAKE 1 TABLET (600 MG TOTAL) BY MOUTH 3 (THREE) TIMES DAILY. 270 tablet 2    hydrocortisone (ANUSOL-HC) 25 mg suppository UNWRAP AND INSERT 1 SUPPOSITORY RECTALLY 2 (TWO) TIMES DAILY AS NEEDED FOR HEMORRHOIDS.  2    levothyroxine (SYNTHROID) 112 MCG tablet TAKE ONE TABLET BY MOUTH ONCE DAILY. 30 tablet 12    meclizine  (ANTIVERT) 25 mg tablet Take 1 tablet (25 mg total) by mouth 3 (three) times daily as needed. 40 tablet 2    multivitamin (MULTIVITAMIN) per tablet Take 1 tablet by mouth once daily.        omeprazole (PRILOSEC) 40 MG capsule TAKE 1 CAPSULE (40 MG TOTAL) BY MOUTH ONCE DAILY. 90 capsule 1    ondansetron (ZOFRAN) 4 MG tablet Take 1 tablet (4 mg total) by mouth every 8 (eight) hours as needed for Nausea. 25 tablet 1    oxyCODONE-acetaminophen (PERCOCET)  mg per tablet Take 1 tablet by mouth every 12 (twelve) hours as needed. 60 tablet 0    polyethylene glycol (GLYCOLAX) 17 gram/dose powder MIX AND TAKE 17 GRAMS BY MOUTH ONCE DAILY. 527 g 1    potassium chloride SA (K-DUR,KLOR-CON) 20 MEQ tablet Take 1 tablet (20 mEq total) by mouth 2 (two) times daily. 180 tablet 2    ranitidine (ZANTAC) 300 MG capsule Take 1 capsule (300 mg total) by mouth nightly. , about 1 hour before bedtime. 30 capsule 6    TENS unit and electrodes Cmpk Apply daily prn neck and back pain 1 each 0    triamcinolone acetonide 0.1% (KENALOG) 0.1 % cream 2 (two) times daily.  5    valACYclovir (VALTREX) 1000 MG tablet TAKE ONE TABLET BY MOUTH THREE TIMES A DAY 21 tablet 7    amoxicillin (AMOXIL) 500 MG capsule TAKE 4 CAPSULES BY MOUTH 1 HOUR BEFORE APPT  0    furosemide (LASIX) 80 MG tablet TAKE 1 TABLET (80 MG TOTAL) BY MOUTH 2 (TWO) TIMES DAILY. 60 tablet 11    LORazepam (ATIVAN) 0.5 MG tablet TAKE 1 TABLET (0.5 MG TOTAL) BY MOUTH EVERY 8 (EIGHT) HOURS AS NEEDED FOR ANXIETY. 40 tablet 1     No current facility-administered medications for this visit.        Review of patient's allergies indicates:   Allergen Reactions    Penicillin g Itching    Iodine      Blurred vision on 2 occasions.    Meperidine      Other reaction(s): hyperventalating  Other reaction(s): blurred vision    Promethazine      Other reaction(s): blurred vision         Review of Systems   Constitution: Negative for chills and fever.   Cardiovascular: Positive  for leg swelling. Negative for claudication.   Skin: Positive for color change, dry skin and nail changes.   Musculoskeletal: Positive for arthritis and joint swelling.   Neurological: Positive for numbness.   Psychiatric/Behavioral: Negative for altered mental status.           Objective:      Physical Exam   Constitutional: She is oriented to person, place, and time. She appears well-developed and well-nourished. No distress.   Cardiovascular:   Pulses:       Dorsalis pedis pulses are 2+ on the right side, and 2+ on the left side.        Posterior tibial pulses are 1+ on the right side, and 1+ on the left side.   CFT <3 seconds bilateral.  Pedal hair growth decreased bilateral.  Varicosities noted bilateral.  Moderate non pitting edema noted bilateral.  Toes are cool to touch bilateral.    Musculoskeletal: She exhibits edema. She exhibits no tenderness.   Muscle strength 5/5 in all muscle groups bilateral.  No tenderness nor crepitation with ROM of foot/ankle joints bilateral.  No pain with palpation of bilateral foot and ankle.  Bilateral pes planus foot type.  Bilateral hallux abducto valgus.  Bilateral semi-rigid contracture of toes 2-5.   Neurological: She is alert and oriented to person, place, and time. She has normal strength. A sensory deficit is present.   Protective sensation per [place]-Sanjeev monofilament intact bilateral.    Vibratory sensation decreased bilateral.    Light touch intact bilateral.   Skin: Skin is warm, dry and intact. Capillary refill takes less than 2 seconds. Lesion noted. No abrasion, no bruising, no burn, no ecchymosis, no laceration and no petechiae noted. She is not diaphoretic. No erythema.   Pedal skin appears thin bilateral.  Toenails x 10 appear thickened by 2 mm's, elongated by 4 mm's, and discolored with subungual debris.  Focal hyperkeratoses noted to bilateral sub 5th metatarsal head and the dorsal lateral aspect of bilateral 5th PIP joint.  Examination of the skin  reveals no evidence of significant maceration, rashes, open lesions, suspicious appearing nevi or other concerning lesions.              Assessment:       Encounter Diagnoses   Name Primary?    Idiopathic peripheral neuropathy Yes    Hammer toes of both feet     Valgus deformity of both great toes     Corn or callus     Onychomycosis due to dermatophyte          Plan:       Darby was seen today for diabetes mellitus and diabetic foot exam.    Diagnoses and all orders for this visit:    Idiopathic peripheral neuropathy    Hammer toes of both feet    Valgus deformity of both great toes    Corn or callus    Onychomycosis due to dermatophyte      I counseled the patient on her conditions, their implications and medical management.    Explained that hyperpigmentation to the dorsal skin of the Rt. 2nd toe is strictly from chaffing.  Recommend use of shoes with a deeper toe box to prevent future ulceration to the digit.    Patient instructed on proper foot hygeine. We discussed wearing proper shoe gear, daily foot inspections, never walking without protective shoe gear, never putting sharp instruments to feet    Fitted and dispensed a toe spacer to minimize pressure to the 2nd digit by the Rt. Hallux.      With patient's permission, nails were aggressively reduced and debrided x 10 to their soft tissue attachment mechanically and with electric , removing all offending nail and debris.  Also, a sterile #15 scalpel was used to trim lesions x 4 down to smooth appearing skin without incident.  Patient relates relief following the procedure. She will continue to monitor the areas daily, inspect her feet, wear protective shoe gear when ambulatory, moisturizer to maintain skin integrity and follow in this office in approximately 4 months, sooner p.r.n.    Follow up in about 4 months (around 9/1/2019).    Wesley Mccabe DPM

## 2019-05-25 DIAGNOSIS — Z87.19 HISTORY OF CHRONIC CONSTIPATION: ICD-10-CM

## 2019-05-25 DIAGNOSIS — R10.32 LLQ PAIN: ICD-10-CM

## 2019-05-27 RX ORDER — POLYETHYLENE GLYCOL 3350 17 G/17G
POWDER, FOR SOLUTION ORAL
Qty: 527 G | Refills: 1 | Status: SHIPPED | OUTPATIENT
Start: 2019-05-27 | End: 2019-10-18 | Stop reason: SDUPTHER

## 2019-06-25 PROBLEM — R73.9 HYPERGLYCEMIA: Status: ACTIVE | Noted: 2019-06-25

## 2019-09-03 ENCOUNTER — OFFICE VISIT (OUTPATIENT)
Dept: PODIATRY | Facility: CLINIC | Age: 74
End: 2019-09-03
Payer: MEDICARE

## 2019-09-03 VITALS
BODY MASS INDEX: 28.7 KG/M2 | HEART RATE: 54 BPM | DIASTOLIC BLOOD PRESSURE: 80 MMHG | HEIGHT: 71 IN | WEIGHT: 205 LBS | SYSTOLIC BLOOD PRESSURE: 148 MMHG

## 2019-09-03 DIAGNOSIS — B35.1 ONYCHOMYCOSIS DUE TO DERMATOPHYTE: ICD-10-CM

## 2019-09-03 DIAGNOSIS — L84 CORN OR CALLUS: ICD-10-CM

## 2019-09-03 DIAGNOSIS — M20.42 HAMMER TOES OF BOTH FEET: ICD-10-CM

## 2019-09-03 DIAGNOSIS — M20.41 HAMMER TOES OF BOTH FEET: ICD-10-CM

## 2019-09-03 DIAGNOSIS — G60.9 IDIOPATHIC PERIPHERAL NEUROPATHY: Primary | ICD-10-CM

## 2019-09-03 DIAGNOSIS — M20.12 VALGUS DEFORMITY OF BOTH GREAT TOES: ICD-10-CM

## 2019-09-03 DIAGNOSIS — M20.11 VALGUS DEFORMITY OF BOTH GREAT TOES: ICD-10-CM

## 2019-09-03 PROCEDURE — 99214 OFFICE O/P EST MOD 30 MIN: CPT | Mod: PBBFAC,PN | Performed by: PODIATRIST

## 2019-09-03 PROCEDURE — 99499 UNLISTED E&M SERVICE: CPT | Mod: S$PBB,,, | Performed by: PODIATRIST

## 2019-09-03 PROCEDURE — 11721 DEBRIDE NAIL 6 OR MORE: CPT | Mod: S$PBB,59,Q9, | Performed by: PODIATRIST

## 2019-09-03 PROCEDURE — 11056 PARNG/CUTG B9 HYPRKR LES 2-4: CPT | Mod: PBBFAC,PN | Performed by: PODIATRIST

## 2019-09-03 PROCEDURE — 11721 PR DEBRIDEMENT OF NAILS, 6 OR MORE: ICD-10-PCS | Mod: S$PBB,59,Q9, | Performed by: PODIATRIST

## 2019-09-03 PROCEDURE — 11056 PR TRIM BENIGN HYPERKERATOTIC SKIN LESION,2-4: ICD-10-PCS | Mod: S$PBB,Q9,, | Performed by: PODIATRIST

## 2019-09-03 PROCEDURE — 11056 PARNG/CUTG B9 HYPRKR LES 2-4: CPT | Mod: S$PBB,Q9,, | Performed by: PODIATRIST

## 2019-09-03 PROCEDURE — 99999 PR PBB SHADOW E&M-EST. PATIENT-LVL IV: CPT | Mod: PBBFAC,,, | Performed by: PODIATRIST

## 2019-09-03 PROCEDURE — 99999 PR PBB SHADOW E&M-EST. PATIENT-LVL IV: ICD-10-PCS | Mod: PBBFAC,,, | Performed by: PODIATRIST

## 2019-09-03 PROCEDURE — 11721 DEBRIDE NAIL 6 OR MORE: CPT | Mod: PBBFAC,PN,59 | Performed by: PODIATRIST

## 2019-09-03 PROCEDURE — 99499 NO LOS: ICD-10-PCS | Mod: S$PBB,,, | Performed by: PODIATRIST

## 2019-09-03 NOTE — PROGRESS NOTES
Subjective:      Patient ID: Darby Rodriguez is a 74 y.o. female.    Chief Complaint: Routine Foot Care (PCP--08/28/2019)    Darby is a 74 y.o. female who presents to the clinic for evaluation and treatment of high risk feet. Darby has a past medical history of Allergy, Anemia, Anemia in chronic kidney disease, Arthritis, Atrial fibrillation, Cervical disc disease, Chronic kidney disease (CKD), stage III (moderate), Colon polyp, Degenerative joint disease (DJD) of hip, Diverticulosis, DJD (degenerative joint disease) of knee, Encounter for blood transfusion, GERD (gastroesophageal reflux disease), Gout, Headache(784.0), HTN (hypertension), Hypothyroidism (acquired), IBS (irritable bowel syndrome) (7/29/2013), Lumbar disc disease, Mixed hyperlipidemia, PONV (postoperative nausea and vomiting), Renal cell cancer, Renal insufficiency, and Sleep apnea. The patient's chief complaint is toenails and calluses that are in need of trimming.  Denies being painful with wearing shoe gear.  Has not attempted to self treat.  Denies experiencing overt neuropathy pain with today's exam.   Denies any additional pedal complaints.       PCP: Jose Frost MD    Last visit: 8/28/19  Hemoglobin A1C   Date Value Ref Range Status   06/25/2019 6.0 (H) 0.0 - 5.6 % Final     Comment:     Reference Interval:  5.0 - 5.6 Normal   5.7 - 6.4 High Risk   > 6.5 Diabetic    Hgb A1c results are standardized based on the (NGSP) National   Glycohemoglobin Standardization Program.    Hemoglobin A1C levels are related to mean serum/plasma glucose   during the preceding 2-3 months.        11/30/2018 5.9 (H) 0.0 - 5.6 % Final     Comment:     Reference Interval:  5.0 - 5.6 Normal   5.7 - 6.4 High Risk   > 6.5 Diabetic    Hgb A1c results are standardized based on the (NGSP) National   Glycohemoglobin Standardization Program.    Hemoglobin A1C levels are related to mean serum/plasma glucose   during the preceding 2-3 months.        02/09/2018 6.0 (H) 0.0  - 5.6 % Final     Comment:     Reference Interval:  5.0 - 5.6 Normal   5.7 - 6.4 High Risk   > 6.5 Diabetic    Hgb A1c results are standardized based on the (NGSP) National   Glycohemoglobin Standardization Program.    Hemoglobin A1C levels are related to mean serum/plasma glucose   during the preceding 2-3 months.            Past Medical History:   Diagnosis Date    Allergy     Anemia     Anemia in chronic kidney disease     Arthritis     Atrial fibrillation     Cervical disc disease     Chronic kidney disease (CKD), stage III (moderate)     Colon polyp     Degenerative joint disease (DJD) of hip     (L); last injected 4/1/15    Diverticulosis     DJD (degenerative joint disease) of knee     left; last injected 4/1/15    Encounter for blood transfusion     GERD (gastroesophageal reflux disease)     Gout     Headache(784.0)     HTN (hypertension)     Hypothyroidism (acquired)     IBS (irritable bowel syndrome) 7/29/2013    Lumbar disc disease     Mixed hyperlipidemia     PONV (postoperative nausea and vomiting)     Renal cell cancer     Renal insufficiency     Dr. Greene    Sleep apnea     wears cPAP       Past Surgical History:   Procedure Laterality Date    COLONOSCOPY  2010?  [facility]ro    (diverticulosis?)    COLONOSCOPY  03/2015    Dr. Pickett, repeat in 5 years for surveillance    COLONOSCOPY N/A 2/26/2019    Per[facility]ett [facility]    COLONOSCOPY N/A 3/4/2015    Per[facility]    COLONOSCOPY N/A 6/17/2013    Per[facility] by [facility] at [facility]    COLONOSCOPY W/ POLYPECTOMY  11/12/2010  Ferro    Two 5 mm polyps in the sigmoid colon.  Diverticulosis (sigmoid, descending, and transverse colon).  Melanosis.    COLONOSCOPY W/ POLYPECTOMY  6/17/13  [facility]    One 1 mm polyp in the distal sigmoid colon.  TUBULAR ADENOMA.  Diverticulosis in the sigmoid colon.  Moderate colonic spasm consistent with IBS.  Redundant  "colon.  Melanosis in the colon.    ESOPHAGOGASTRODUODENOSCOPY  4/27/2012  [facility]ro    Normal esophagus.  Small hiatal hernia.  Antrum erythema.  REACTIVE/CHEMICAL GASTROPATHY.  NO HELICOBACTER SEEN.    ESOPHAGOGASTRODUODENOSCOPY (EGD) N/A 1/5/2015    [facility]    ESOPHAGOSCOPY W/ DILATION  1/5/2015  [facility]    Benign-appearing esophageal stricture or "ring," dilated 54 Fr.  Reflux esophagitis. (NERD).  Hiatus hernia.  Erythematous mucosa in the antrum.  NONNEOPLASTIC GASTRIC MUCOSA SHOWING REACTIVE/REPARATIVE CHANGES.  CECY Test  Negative.    HYSTERECTOMY  1980    Partial- one ovary remains- patient believes the right one remains    JOINT REPLACEMENT  4/15/11    (R) TKA    JOINT REPLACEMENT  8/19/09    (R)PAVAN    KNEE ARTHROSCOPY      (R) knee    RADIOFREQUENCY ABLATION Right 10/20/2014    UPPER GASTROINTESTINAL ENDOSCOPY  01/2015    Dr. Pickett       Family History   Problem Relation Age of Onset    Heart disease Sister 50        mi    Ovarian cancer Sister 53    Diabetes Mother     Colon cancer Father 65    Breast cancer Neg Hx     Crohn's disease Neg Hx     Ulcerative colitis Neg Hx        Social History     Socioeconomic History    Marital status:      Spouse name: Not on file    Number of children: Not on file    Years of education: Not on file    Highest education level: Not on file   Occupational History    Not on file   Social Needs    Financial resource strain: Not on file    Food insecurity:     Worry: Not on file     Inability: Not on file    Transportation needs:     Medical: Not on file     Non-medical: Not on file   Tobacco Use    Smoking status: Never Smoker    Smokeless tobacco: Never Used   Substance and Sexual Activity    Alcohol use: No    Drug use: Yes     Types: Oxycodone    Sexual activity: Not on file   Lifestyle    Physical activity:     Days per week: Not on file     Minutes per session: Not on file    Stress: Not on file "   Relationships    Social connections:     Talks on phone: Not on file     Gets together: Not on file     Attends Yarsanism service: Not on file     Active member of club or organization: Not on file     Attends meetings of clubs or organizations: Not on file     Relationship status: Not on file   Other Topics Concern    Not on file   Social History Narrative    Not on file       Current Outpatient Medications   Medication Sig Dispense Refill    allopurinol (ZYLOPRIM) 300 MG tablet TAKE 1 TABLET BY MOUTH EVERY DAY 90 tablet 3    ascorbic acid (VITAMIN C) 1000 MG tablet Take 1,000 mg by mouth once daily.       aspirin (ECOTRIN) 81 MG EC tablet Take 81 mg by mouth once a week.       atenolol (TENORMIN) 100 MG tablet Take 1 tablet (100 mg total) by mouth once daily. 90 tablet 3    atorvastatin (LIPITOR) 10 MG tablet TAKE ONE TABLET BY MOUTH ONCE DAILY. 90 tablet 3    calcitRIOL (ROCALTROL) 0.25 MCG Cap Take 1 capsule (0.25 mcg total) by mouth once daily. Monday through Friday. 30 capsule 4    cholecalciferol, vitamin D3, (VITAMIN D3) 2,000 unit Cap Take 1 capsule by mouth once daily. Mon - Fri      colchicine (COLCRYS) 0.6 mg tablet Take 1 tablet (0.6 mg total) by mouth 2 (two) times daily as needed. 60 tablet 5    diazePAM (VALIUM) 2 MG tablet Take 1 tablet (2 mg total) by mouth every 12 (twelve) hours as needed for Anxiety. 20 tablet 1    diclofenac sodium (VOLTAREN) 1 % Gel Apply 2 g topically 3 (three) times daily. 3 Tube 3    dicyclomine (BENTYL) 10 MG capsule TAKE 1 OR 2 CAPSULES BY MOUTH UP TO FOUR TIMES DAILY USUALLY BEFORE MEALS AND AT BEDTIME TO EASE BLOATING AND GAS PAINS 120 capsule 11    diltiaZEM (CARDIZEM CD) 180 MG 24 hr capsule Take 1 capsule (180 mg total) by mouth nightly. 90 capsule 3    diphenoxylate-atropine 2.5-0.025 mg (LOMOTIL) 2.5-0.025 mg per tablet Take 1 tablet by mouth 4 (four) times daily as needed for Diarrhea. 30 tablet 1    ferrous sulfate 325 mg (65 mg iron) Tab Take  1 tablet by mouth. Pt takes 2 times a week      fish oil-omega-3 fatty acids 300-1,000 mg capsule Take by mouth once daily.      folic acid/multivit-min/lutein (CENTRUM SILVER ORAL) Take by mouth.      gabapentin (NEURONTIN) 600 MG tablet TAKE 1 TABLET (600 MG TOTAL) BY MOUTH 3 (THREE) TIMES DAILY. 270 tablet 2    GINKGO BILOBA ORAL Take by mouth.      hydrocortisone (ANUSOL-HC) 25 mg suppository UNWRAP AND INSERT 1 SUPPOSITORY RECTALLY 2 (TWO) TIMES DAILY AS NEEDED FOR HEMORRHOIDS.  2    levothyroxine (SYNTHROID) 112 MCG tablet Take 1 tablet (112 mcg total) by mouth once daily. 90 tablet 3    magnesium oxide (MAG-OX) 400 mg (241.3 mg magnesium) tablet Take 400 mg by mouth once daily.      meclizine (ANTIVERT) 25 mg tablet Take 1 tablet (25 mg total) by mouth 3 (three) times daily as needed. 40 tablet 2    multivitamin (MULTIVITAMIN) per tablet Take 1 tablet by mouth once daily.        omeprazole (PRILOSEC) 40 MG capsule TAKE 1 CAPSULE (40 MG TOTAL) BY MOUTH ONCE DAILY. 90 capsule 1    ondansetron (ZOFRAN) 4 MG tablet Take 1 tablet (4 mg total) by mouth every 8 (eight) hours as needed for Nausea. 25 tablet 1    oxyCODONE-acetaminophen (PERCOCET)  mg per tablet Take 1 tablet by mouth every 12 (twelve) hours as needed. 60 tablet 0    polyethylene glycol (GLYCOLAX) 17 gram/dose powder MIX AND TAKE 17 GRAMS BY MOUTH ONCE DAILY. 527 g 1    potassium chloride SA (K-DUR,KLOR-CON) 20 MEQ tablet Take 1 tablet (20 mEq total) by mouth 2 (two) times daily. 180 tablet 2    potassium chloride SA (K-DUR,KLOR-CON) 20 MEQ tablet TAKE ONE TABLET BY MOUTH TWO TIMES DAILY. 180 tablet 2    ranitidine (ZANTAC) 300 MG capsule Take 1 capsule (300 mg total) by mouth nightly. , about 1 hour before bedtime. 30 capsule 6    TENS unit and electrodes Cmpk Apply daily prn neck and back pain 1 each 0    triamcinolone acetonide 0.1% (KENALOG) 0.1 % cream 2 (two) times daily.  5    valACYclovir (VALTREX) 1000 MG tablet TAKE  ONE TABLET BY MOUTH THREE TIMES A DAY 21 tablet 7    vitamin B complex (B COMPLEX-VITAMIN B12 ORAL) Take by mouth.      baclofen (LIORESAL) 10 MG tablet Take 1 tablet (10 mg total) by mouth every evening. 20 tablet 1    furosemide (LASIX) 80 MG tablet TAKE 1 TABLET (80 MG TOTAL) BY MOUTH 2 (TWO) TIMES DAILY. 60 tablet 11    LORazepam (ATIVAN) 0.5 MG tablet TAKE 1 TABLET (0.5 MG TOTAL) BY MOUTH EVERY 8 (EIGHT) HOURS AS NEEDED FOR ANXIETY. 40 tablet 1     No current facility-administered medications for this visit.        Review of patient's allergies indicates:   Allergen Reactions    Penicillin g Itching    Iodine      Blurred vision on 2 occasions.    Meperidine      Other reaction(s): hyperventalating  Other reaction(s): blurred vision    Promethazine      Other reaction(s): blurred vision         Review of Systems   Constitution: Negative for chills and fever.   Cardiovascular: Positive for leg swelling. Negative for claudication.   Skin: Positive for color change, dry skin and nail changes.   Musculoskeletal: Positive for arthritis and joint swelling.   Neurological: Positive for numbness.   Psychiatric/Behavioral: Negative for altered mental status.           Objective:      Physical Exam   Constitutional: She is oriented to person, place, and time. She appears well-developed and well-nourished. No distress.   Cardiovascular:   Pulses:       Dorsalis pedis pulses are 2+ on the right side, and 2+ on the left side.        Posterior tibial pulses are 1+ on the right side, and 1+ on the left side.   CFT <3 seconds bilateral.  Pedal hair growth decreased bilateral.  Varicosities noted bilateral.  Moderate non pitting edema noted bilateral.  Toes are cool to touch bilateral.    Musculoskeletal: She exhibits edema. She exhibits no tenderness.   Muscle strength 5/5 in all muscle groups bilateral.  No tenderness nor crepitation with ROM of foot/ankle joints bilateral.  No pain with palpation of bilateral foot  and ankle.  Bilateral pes planus foot type.  Bilateral hallux abducto valgus.  Bilateral semi-rigid contracture of toes 2-5.   Neurological: She is alert and oriented to person, place, and time. She has normal strength. A sensory deficit is present.   Protective sensation per [place] monofilament intact bilateral.    Vibratory sensation decreased bilateral.    Light touch intact bilateral.   Skin: Skin is warm, dry and intact. Capillary refill takes less than 2 seconds. Lesion noted. No abrasion, no bruising, no burn, no ecchymosis, no laceration and no petechiae noted. She is not diaphoretic. No erythema.   Pedal skin appears thin bilateral.  Toenails x 10 appear thickened by 2 mm's, elongated by 6 mm's, and discolored with subungual debris.  Focal hyperkeratoses noted to bilateral sub 5th metatarsal head and the dorsal lateral aspect of bilateral 5th PIP joint.  Examination of the skin reveals no evidence of significant maceration, rashes, open lesions, suspicious appearing nevi or other concerning lesions.              Assessment:       Encounter Diagnoses   Name Primary?    Idiopathic peripheral neuropathy Yes    Hammer toes of both feet     Valgus deformity of both great toes     Corn or callus     Onychomycosis due to dermatophyte          Plan:       Darby was seen today for routine foot care.    Diagnoses and all orders for this visit:    Idiopathic peripheral neuropathy    Hammer toes of both feet    Valgus deformity of both great toes    Corn or callus    Onychomycosis due to dermatophyte      I counseled the patient on her conditions, their implications and medical management.    Advised to continue wearing shoe gear that accommodates digital deformities.     Patient instructed on proper foot hygeine. We discussed wearing proper shoe gear, daily foot inspections, never walking without protective shoe gear, never putting sharp instruments to feet    With patient's permission, nails were  aggressively reduced and debrided x 10 to their soft tissue attachment mechanically and with electric , removing all offending nail and debris.  Also, a sterile #15 scalpel was used to trim lesions x 4 down to smooth appearing skin without incident.  Patient relates relief following the procedure. She will continue to monitor the areas daily, inspect her feet, wear protective shoe gear when ambulatory, moisturizer to maintain skin integrity and follow in this office in approximately 4 months, sooner p.r.n.    Follow up in about 4 months (around 1/3/2020).    Wesley Mccabe DPM

## 2019-10-18 DIAGNOSIS — R10.32 LLQ PAIN: ICD-10-CM

## 2019-10-18 DIAGNOSIS — Z87.19 HISTORY OF CHRONIC CONSTIPATION: ICD-10-CM

## 2019-10-18 RX ORDER — POLYETHYLENE GLYCOL 3350 17 G/17G
POWDER, FOR SOLUTION ORAL
Qty: 476 G | Refills: 1 | Status: SHIPPED | OUTPATIENT
Start: 2019-10-18 | End: 2020-01-17 | Stop reason: SDUPTHER

## 2019-11-14 PROBLEM — G47.33 OBSTRUCTIVE SLEEP APNEA SYNDROME: Status: ACTIVE | Noted: 2019-11-14

## 2019-11-14 PROBLEM — J01.91 ACUTE RECURRENT SINUSITIS: Status: ACTIVE | Noted: 2019-11-14

## 2019-12-06 PROBLEM — I48.0 PAF (PAROXYSMAL ATRIAL FIBRILLATION): Status: ACTIVE | Noted: 2019-12-06

## 2020-01-08 ENCOUNTER — TELEPHONE (OUTPATIENT)
Dept: CARDIOLOGY | Facility: CLINIC | Age: 75
End: 2020-01-08

## 2020-01-08 NOTE — TELEPHONE ENCOUNTER
Left message for patient to return call to the office. Patient must schedule and appt to be seen in order to get a cardiac clearance

## 2020-01-10 ENCOUNTER — TELEPHONE (OUTPATIENT)
Dept: CARDIOLOGY | Facility: CLINIC | Age: 75
End: 2020-01-10

## 2020-01-10 NOTE — TELEPHONE ENCOUNTER
----- Message from Ben Sandoval sent at 1/10/2020  2:21 PM CST -----  Contact: pt  Type:  Patient Returning Call    Who Called:  pt  Who Left Message for Patient:  Mandy MCCALL  Does the patient know what this is regarding?:  Scheduling appt for clearance for surgery  Best Call Back Number:  [phone]  Additional Information:

## 2020-01-13 ENCOUNTER — TELEPHONE (OUTPATIENT)
Dept: CARDIOLOGY | Facility: CLINIC | Age: 75
End: 2020-01-13

## 2020-01-13 NOTE — TELEPHONE ENCOUNTER
----- Message from Kerry Gutiérrez sent at 1/13/2020 10:03 AM CST -----  Contact: Darby pt  Type:  Sooner Apoointment Request    Caller is requesting a sooner appointment.  Caller declined first available appointment listed below.  Caller will not accept being placed on the waitlist and is requesting a message be sent to doctor.    Name of Caller:  Darby  When is the first available appointment?  Needs a soon appt for surgery clearance  Symptoms:  Needs clearance for hand surgery  Best Call Back Number:  [phone]  Additional Information:  Pls call pt to schedule an appt soon

## 2020-01-14 ENCOUNTER — OFFICE VISIT (OUTPATIENT)
Dept: CARDIOLOGY | Facility: CLINIC | Age: 75
End: 2020-01-14
Payer: MEDICARE

## 2020-01-14 VITALS
HEART RATE: 52 BPM | SYSTOLIC BLOOD PRESSURE: 130 MMHG | HEIGHT: 71 IN | DIASTOLIC BLOOD PRESSURE: 74 MMHG | BODY MASS INDEX: 27.99 KG/M2 | WEIGHT: 199.94 LBS

## 2020-01-14 DIAGNOSIS — I48.0 PAF (PAROXYSMAL ATRIAL FIBRILLATION): Primary | ICD-10-CM

## 2020-01-14 DIAGNOSIS — E78.5 DYSLIPIDEMIA: ICD-10-CM

## 2020-01-14 DIAGNOSIS — I10 ESSENTIAL HYPERTENSION: ICD-10-CM

## 2020-01-14 PROCEDURE — 99214 OFFICE O/P EST MOD 30 MIN: CPT | Mod: S$PBB,,, | Performed by: INTERNAL MEDICINE

## 2020-01-14 PROCEDURE — 1126F AMNT PAIN NOTED NONE PRSNT: CPT | Mod: ,,, | Performed by: INTERNAL MEDICINE

## 2020-01-14 PROCEDURE — 99214 PR OFFICE/OUTPT VISIT, EST, LEVL IV, 30-39 MIN: ICD-10-PCS | Mod: S$PBB,,, | Performed by: INTERNAL MEDICINE

## 2020-01-14 PROCEDURE — 99999 PR PBB SHADOW E&M-EST. PATIENT-LVL III: CPT | Mod: PBBFAC,,, | Performed by: INTERNAL MEDICINE

## 2020-01-14 PROCEDURE — 1126F PR PAIN SEVERITY QUANTIFIED, NO PAIN PRESENT: ICD-10-PCS | Mod: ,,, | Performed by: INTERNAL MEDICINE

## 2020-01-14 PROCEDURE — 99999 PR PBB SHADOW E&M-EST. PATIENT-LVL III: ICD-10-PCS | Mod: PBBFAC,,, | Performed by: INTERNAL MEDICINE

## 2020-01-14 PROCEDURE — 1159F MED LIST DOCD IN RCRD: CPT | Mod: ,,, | Performed by: INTERNAL MEDICINE

## 2020-01-14 PROCEDURE — 99213 OFFICE O/P EST LOW 20 MIN: CPT | Mod: PBBFAC,PO | Performed by: INTERNAL MEDICINE

## 2020-01-14 PROCEDURE — 1159F PR MEDICATION LIST DOCUMENTED IN MEDICAL RECORD: ICD-10-PCS | Mod: ,,, | Performed by: INTERNAL MEDICINE

## 2020-01-14 NOTE — PROGRESS NOTES
Subjective:    Patient ID:  Darby Rodriguez is a 74 y.o. female who presents for follow-up of hypertension      HPI  She comes with no complaints, no chest pain, no shortness of breath  Having carpal tunnel surgery soon  FC II    Review of Systems   Constitution: Negative for decreased appetite, malaise/fatigue, weight gain and weight loss.   Cardiovascular: Negative for chest pain, dyspnea on exertion, leg swelling, palpitations and syncope.   Respiratory: Negative for cough and shortness of breath.    Gastrointestinal: Negative.    Neurological: Negative for weakness.   All other systems reviewed and are negative.       Objective:      Physical Exam   Constitutional: She is oriented to person, place, and time. She appears well-developed and well-nourished.   HENT:   Head: Normocephalic.   Eyes: Pupils are equal, round, and reactive to light.   Neck: Normal range of motion. Neck supple. No JVD present. Carotid bruit is not present. No thyromegaly present.   Cardiovascular: Normal rate, regular rhythm, normal heart sounds, intact distal pulses and normal pulses. PMI is not displaced. Exam reveals no gallop.   No murmur heard.  Pulmonary/Chest: Effort normal and breath sounds normal.   Abdominal: Soft. Normal appearance. She exhibits no mass. There is no hepatosplenomegaly. There is no tenderness.   Musculoskeletal: Normal range of motion. She exhibits no edema.   Neurological: She is alert and oriented to person, place, and time. She has normal strength and normal reflexes. No sensory deficit.   Skin: Skin is warm and intact.   Psychiatric: She has a normal mood and affect.   Nursing note and vitals reviewed.        Assessment:       1. PAF (paroxysmal atrial fibrillation)    2. Dyslipidemia    3. Essential hypertension         Plan:   No contraindication for surgery/anesthesia from cardiac standpoint    Continue all cardiac medications  Regular exercise program  Weight loss  1 yr f/u

## 2020-01-23 ENCOUNTER — TELEPHONE (OUTPATIENT)
Dept: CARDIOLOGY | Facility: CLINIC | Age: 75
End: 2020-01-23

## 2020-01-23 NOTE — TELEPHONE ENCOUNTER
Patient need clearance for right carpal tunnel release    Patient is currently taking ASA      Please advise if patient have contradictions for surgery/ anesthesia from cardiac standpoint.

## 2020-02-03 PROBLEM — G56.01 CARPAL TUNNEL SYNDROME, RIGHT: Status: ACTIVE | Noted: 2020-02-03

## 2020-02-17 PROBLEM — J01.91 ACUTE RECURRENT SINUSITIS: Status: RESOLVED | Noted: 2019-11-14 | Resolved: 2020-02-17

## 2020-10-01 ENCOUNTER — HOSPITAL ENCOUNTER (OUTPATIENT)
Dept: RADIOLOGY | Facility: HOSPITAL | Age: 75
Discharge: HOME | End: 2020-10-01
Attending: FAMILY MEDICINE
Payer: MEDICARE

## 2020-10-01 DIAGNOSIS — M81.0 AGE-RELATED OSTEOPOROSIS WITHOUT CURRENT PATHOLOGICAL FRACTURE: ICD-10-CM

## 2020-10-01 PROCEDURE — 77080 DXA BONE DENSITY AXIAL: CPT | Mod: 26,,, | Performed by: RADIOLOGY

## 2020-10-01 PROCEDURE — 77080 DEXA BONE DENSITY SPINE HIP: ICD-10-PCS | Mod: 26,,, | Performed by: RADIOLOGY

## 2020-10-01 PROCEDURE — 77080 DXA BONE DENSITY AXIAL: CPT | Mod: TC,PO

## 2020-11-24 ENCOUNTER — HOSPITAL ENCOUNTER (OUTPATIENT)
Dept: RADIOLOGY | Facility: HOSPITAL | Age: 75
Discharge: HOME | End: 2020-11-24
Attending: FAMILY MEDICINE
Payer: MEDICARE

## 2020-11-24 DIAGNOSIS — Z12.31 SCREENING MAMMOGRAM, ENCOUNTER FOR: ICD-10-CM

## 2020-11-24 PROCEDURE — 77067 SCR MAMMO BI INCL CAD: CPT | Mod: 26,,, | Performed by: RADIOLOGY

## 2020-11-24 PROCEDURE — 77063 BREAST TOMOSYNTHESIS BI: CPT | Mod: 26,,, | Performed by: RADIOLOGY

## 2020-11-24 PROCEDURE — 77067 SCR MAMMO BI INCL CAD: CPT | Mod: TC,PO

## 2020-11-24 PROCEDURE — 77063 MAMMO DIGITAL SCREENING BILAT WITH TOMO: ICD-10-PCS | Mod: 26,,, | Performed by: RADIOLOGY

## 2020-11-24 PROCEDURE — 77067 MAMMO DIGITAL SCREENING BILAT WITH TOMO: ICD-10-PCS | Mod: 26,,, | Performed by: RADIOLOGY

## 2020-12-16 ENCOUNTER — TELEPHONE (OUTPATIENT)
Dept: GASTROENTEROLOGY | Facility: CLINIC | Age: 75
End: 2020-12-16

## 2020-12-16 NOTE — TELEPHONE ENCOUNTER
----- Message from Cecy Justin sent at 12/16/2020  4:13 PM CST -----  Regarding: appt access  Contact: pt  Type: Needs Medical Advice    Who Called:      Best Call Back Number:     Additional Information: Requesting a call back from Nurse, regarding pt would like to come in tomorrow for abdominal pain ,please call back with advice

## 2020-12-31 PROBLEM — M77.8 LEFT WRIST TENDONITIS: Status: ACTIVE | Noted: 2020-12-31

## 2021-01-04 ENCOUNTER — OFFICE VISIT (OUTPATIENT)
Dept: GASTROENTEROLOGY | Facility: CLINIC | Age: 76
End: 2021-01-04
Payer: MEDICARE

## 2021-01-04 VITALS — BODY MASS INDEX: 27.4 KG/M2 | WEIGHT: 195.75 LBS | RESPIRATION RATE: 18 BRPM | HEIGHT: 71 IN

## 2021-01-04 DIAGNOSIS — K31.89 GASTRIC WALL THICKENING: ICD-10-CM

## 2021-01-04 DIAGNOSIS — Z87.19 HISTORY OF DIVERTICULOSIS: ICD-10-CM

## 2021-01-04 DIAGNOSIS — Z87.19 HISTORY OF IBS: ICD-10-CM

## 2021-01-04 DIAGNOSIS — R11.0 NAUSEA: ICD-10-CM

## 2021-01-04 DIAGNOSIS — R10.32 LEFT LOWER QUADRANT ABDOMINAL PAIN: Primary | ICD-10-CM

## 2021-01-04 DIAGNOSIS — Z87.19 HISTORY OF CHRONIC CONSTIPATION: ICD-10-CM

## 2021-01-04 DIAGNOSIS — K44.9 HIATAL HERNIA: ICD-10-CM

## 2021-01-04 PROCEDURE — 99214 OFFICE O/P EST MOD 30 MIN: CPT | Mod: S$PBB,,, | Performed by: NURSE PRACTITIONER

## 2021-01-04 PROCEDURE — 99215 OFFICE O/P EST HI 40 MIN: CPT | Mod: PBBFAC,PO | Performed by: NURSE PRACTITIONER

## 2021-01-04 PROCEDURE — 99999 PR PBB SHADOW E&M-EST. PATIENT-LVL V: ICD-10-PCS | Mod: PBBFAC,,, | Performed by: NURSE PRACTITIONER

## 2021-01-04 PROCEDURE — 99999 PR PBB SHADOW E&M-EST. PATIENT-LVL V: CPT | Mod: PBBFAC,,, | Performed by: NURSE PRACTITIONER

## 2021-01-04 PROCEDURE — 99214 PR OFFICE/OUTPT VISIT, EST, LEVL IV, 30-39 MIN: ICD-10-PCS | Mod: S$PBB,,, | Performed by: NURSE PRACTITIONER

## 2021-01-04 RX ORDER — POLYETHYLENE GLYCOL 3350 17 G/17G
17 POWDER, FOR SOLUTION ORAL DAILY
Qty: 510 G | Refills: 2 | Status: SHIPPED | OUTPATIENT
Start: 2021-01-04 | End: 2021-10-19

## 2021-01-04 RX ORDER — DICYCLOMINE HYDROCHLORIDE 20 MG/1
20 TABLET ORAL
Qty: 120 TABLET | Refills: 1 | Status: SHIPPED | OUTPATIENT
Start: 2021-01-04 | End: 2021-02-03

## 2021-01-06 ENCOUNTER — HOSPITAL ENCOUNTER (OUTPATIENT)
Dept: RADIOLOGY | Facility: HOSPITAL | Age: 76
Discharge: HOME | End: 2021-01-06
Attending: NURSE PRACTITIONER
Payer: MEDICARE

## 2021-01-06 DIAGNOSIS — Z87.19 HISTORY OF CHRONIC CONSTIPATION: ICD-10-CM

## 2021-01-06 DIAGNOSIS — K44.9 HIATAL HERNIA: ICD-10-CM

## 2021-01-06 DIAGNOSIS — Z87.19 HISTORY OF DIVERTICULOSIS: ICD-10-CM

## 2021-01-06 DIAGNOSIS — K31.89 GASTRIC WALL THICKENING: ICD-10-CM

## 2021-01-06 DIAGNOSIS — R10.32 LEFT LOWER QUADRANT ABDOMINAL PAIN: ICD-10-CM

## 2021-01-06 DIAGNOSIS — R11.0 NAUSEA: ICD-10-CM

## 2021-01-06 PROCEDURE — 25500020 PHARM REV CODE 255: Mod: PO | Performed by: NURSE PRACTITIONER

## 2021-01-06 PROCEDURE — 74176 CT ABD & PELVIS W/O CONTRAST: CPT | Mod: 26,,, | Performed by: RADIOLOGY

## 2021-01-06 PROCEDURE — A9698 NON-RAD CONTRAST MATERIALNOC: HCPCS | Mod: PO | Performed by: NURSE PRACTITIONER

## 2021-01-06 PROCEDURE — 74176 CT ABDOMEN PELVIS WITHOUT CONTRAST: ICD-10-PCS | Mod: 26,,, | Performed by: RADIOLOGY

## 2021-01-06 PROCEDURE — 74176 CT ABD & PELVIS W/O CONTRAST: CPT | Mod: TC,PO

## 2021-01-06 RX ADMIN — BARIUM SULFATE 900 ML: 20 SUSPENSION ORAL at 11:01

## 2021-01-07 ENCOUNTER — TELEPHONE (OUTPATIENT)
Dept: GASTROENTEROLOGY | Facility: CLINIC | Age: 76
End: 2021-01-07

## 2021-01-07 DIAGNOSIS — K76.9 LIVER LESION: Primary | ICD-10-CM

## 2021-01-07 DIAGNOSIS — R74.8 ELEVATED LIPASE: ICD-10-CM

## 2021-05-07 ENCOUNTER — TELEPHONE (OUTPATIENT)
Dept: RHEUMATOLOGY | Facility: CLINIC | Age: 76
End: 2021-05-07

## 2021-10-18 DIAGNOSIS — Z87.19 HISTORY OF CHRONIC CONSTIPATION: ICD-10-CM

## 2021-10-19 RX ORDER — NICOTINE POLACRILEX 2 MG
LOZENGE BUCCAL
Qty: 510 G | Refills: 2 | Status: SHIPPED | OUTPATIENT
Start: 2021-10-19 | End: 2022-12-05 | Stop reason: SDUPTHER

## 2022-03-10 DIAGNOSIS — H91.90 HEARING DIFFICULTY, UNSPECIFIED LATERALITY: Primary | ICD-10-CM

## 2022-03-15 ENCOUNTER — OFFICE VISIT (OUTPATIENT)
Dept: OTOLARYNGOLOGY | Facility: CLINIC | Age: 77
End: 2022-03-15
Payer: MEDICARE

## 2022-03-15 ENCOUNTER — CLINICAL SUPPORT (OUTPATIENT)
Dept: AUDIOLOGY | Facility: CLINIC | Age: 77
End: 2022-03-15
Payer: MEDICARE

## 2022-03-15 VITALS
TEMPERATURE: 99 F | SYSTOLIC BLOOD PRESSURE: 156 MMHG | WEIGHT: 211.63 LBS | HEIGHT: 71 IN | BODY MASS INDEX: 29.63 KG/M2 | DIASTOLIC BLOOD PRESSURE: 74 MMHG

## 2022-03-15 DIAGNOSIS — R42 DIZZINESS: ICD-10-CM

## 2022-03-15 DIAGNOSIS — H90.3 BILATERAL HIGH FREQUENCY SENSORINEURAL HEARING LOSS: Primary | ICD-10-CM

## 2022-03-15 PROCEDURE — 99999 PR PBB SHADOW E&M-EST. PATIENT-LVL I: ICD-10-PCS | Mod: PBBFAC,,,

## 2022-03-15 PROCEDURE — 99211 OFF/OP EST MAY X REQ PHY/QHP: CPT | Mod: PBBFAC,PO

## 2022-03-15 PROCEDURE — 99999 PR PBB SHADOW E&M-EST. PATIENT-LVL III: CPT | Mod: PBBFAC,,, | Performed by: NURSE PRACTITIONER

## 2022-03-15 PROCEDURE — 99999 PR PBB SHADOW E&M-EST. PATIENT-LVL III: ICD-10-PCS | Mod: PBBFAC,,, | Performed by: NURSE PRACTITIONER

## 2022-03-15 PROCEDURE — 99999 PR PBB SHADOW E&M-EST. PATIENT-LVL I: CPT | Mod: PBBFAC,,,

## 2022-03-15 PROCEDURE — 92557 COMPREHENSIVE HEARING TEST: CPT | Mod: PBBFAC,PO | Performed by: AUDIOLOGIST-HEARING AID FITTER

## 2022-03-15 PROCEDURE — 92567 TYMPANOMETRY: CPT | Mod: PBBFAC,PO | Performed by: AUDIOLOGIST-HEARING AID FITTER

## 2022-03-15 PROCEDURE — 99213 OFFICE O/P EST LOW 20 MIN: CPT | Mod: S$PBB,ICN,, | Performed by: NURSE PRACTITIONER

## 2022-03-15 PROCEDURE — 99213 OFFICE O/P EST LOW 20 MIN: CPT | Mod: PBBFAC,25,PO | Performed by: NURSE PRACTITIONER

## 2022-03-15 PROCEDURE — 99213 PR OFFICE/OUTPT VISIT, EST, LEVL III, 20-29 MIN: ICD-10-PCS | Mod: S$PBB,ICN,, | Performed by: NURSE PRACTITIONER

## 2022-03-15 RX ORDER — AZITHROMYCIN 250 MG/1
250 TABLET, FILM COATED ORAL DAILY
COMMUNITY
End: 2022-04-07 | Stop reason: ALTCHOICE

## 2022-03-15 RX ORDER — CIPROFLOXACIN 500 MG/1
500 TABLET ORAL DAILY
COMMUNITY
End: 2022-11-01 | Stop reason: ALTCHOICE

## 2022-03-15 NOTE — PROGRESS NOTES
"Subjective:       Patient ID: Darby Rodriguez is a 76 y.o. female.    Chief Complaint: No chief complaint on file.    HPI   Patient is new to ENT, referred by HERBIE Juan for consultation for dizziness. Patient is a retired . Patient reports dizziness has caused her to fall 3 times since the start of this year. She has begun using a cane to ambulate. She describes dizziness as off-balance. She denies any true vertigo: no spinning or swirling sensation. Patient was told in January that she had fluid in her ears. This was treated with Flonase.     Review of Systems   Constitutional: Negative.    HENT: Negative.  Negative for hearing loss.    Eyes: Negative.    Respiratory: Negative.    Cardiovascular: Negative.    Gastrointestinal: Negative.    Musculoskeletal: Negative.    Integumentary:  Negative.   Neurological: Positive for dizziness, weakness, disturbances in coordination and coordination difficulties. Negative for vertigo.   Hematological: Negative.    Psychiatric/Behavioral: Negative.          Objective:      Physical Exam  Vitals and nursing note reviewed.   Constitutional:       General: She is not in acute distress.     Appearance: She is well-developed. She is not ill-appearing or diaphoretic.   HENT:      Head: Normocephalic and atraumatic.      Right Ear: Hearing, tympanic membrane, ear canal and external ear normal. No middle ear effusion. Tympanic membrane is not erythematous. Tympanic membrane has normal mobility.      Left Ear: Hearing, tympanic membrane, ear canal and external ear normal.  No middle ear effusion. Tympanic membrane is not erythematous. Tympanic membrane has normal mobility.      Ears:      Comments: Type "A" tympanograms AU consistent with well-pneumatized mesotympanums and absence of middle ear effusions     Nose: Nose normal.      Mouth/Throat:      Dentition: Has dentures.      Pharynx: Uvula midline.   Eyes:      General: Lids are normal. No scleral icterus.        " Right eye: No discharge.         Left eye: No discharge.   Neck:      Trachea: Trachea normal. No tracheal deviation.   Cardiovascular:      Rate and Rhythm: Normal rate.   Pulmonary:      Effort: Pulmonary effort is normal. No respiratory distress.      Breath sounds: No stridor. No wheezing.   Musculoskeletal:         General: Normal range of motion.      Cervical back: Normal range of motion and neck supple.   Skin:     General: Skin is warm and dry.      Coloration: Skin is not pale.   Neurological:      Mental Status: She is alert and oriented to person, place, and time.      Coordination: Coordination normal.      Gait: Gait normal.   Psychiatric:         Speech: Speech normal.         Behavior: Behavior normal. Behavior is cooperative.         Thought Content: Thought content normal.         Judgment: Judgment normal.         Assessment:       Problem List Items Addressed This Visit    None     Visit Diagnoses     Bilateral high frequency sensorineural hearing loss    -  Primary    Dizziness              Plan:     Lengthy discussion regarding dysequilibrium versus vertigo. Patient is adamant that she is not experiencing vertigo. She agrees to return to ENT for any true vertigo for vestibular testing.     Discussed dysequilibrium is likely multifactorial: dehydration, anemia, hypo/hyperglycemia, hypo/hypertensive, arhythmia, side effects of medications, age related, etc. Patient to discuss with PCP.   Reassured pt no residual middle ear fluid remains at this time.   Patient mentioned globus sensation at the end of the visit. She takes omeprazole QAM AC. Advised add Pepcid QHS X few weeks and recommend laryngoscope exam if not improving. Also recommend f/u with your GI to rule out intrinsic esophageal pathology.   Patient encouraged to return to clinic if symptoms worsen/persist and as needed for further ENT symptoms or concerns.

## 2022-03-15 NOTE — PROGRESS NOTES
Darby Rodriguez was seen 03/15/2022 for an audiological evaluation. Pertinent complaints today include hearing loss and dizziness. Pt denies family Hx of HL, tinnitus and loud noise exposure. Otoscopy revealed moderate cerumen in the right ear. The tympanic membrane was visualized AU prior to proceeding with the hearing testing.     Results reveal a bilateral normal through 4K Hz sloping to moderate HF sensorineural hearing loss.    Speech Reception Thresholds were  5 dBHL for the right ear and 0 dBHL for the left ear.    Word recognition scores were excellent for the right ear and good for the left ear.   Tympanograms were Type A for the right ear and Type A for the left ear.    Audiogram results were reviewed in detail with patient and all questions were answered. Results will be reviewed by ENT at the completion of this note. Recommend further medical evaluation with an ENT provider to rule out vertigo, repeat hearing testing in one year and bilateral hearing protection with either muffs or in-ear protection in loud noises.

## 2022-12-06 ENCOUNTER — TELEPHONE (OUTPATIENT)
Dept: NEPHROLOGY | Facility: CLINIC | Age: 77
End: 2022-12-06
Payer: MEDICARE

## 2022-12-06 NOTE — TELEPHONE ENCOUNTER
----- Message from Aida Fox sent at 12/6/2022  2:00 PM CST -----  Contact: [phone]  Type: Needs Medical Advice  Who Called:  Pt     Best Call Back Number: [phone]    Additional Information: Pt stated someone from the office called her today and said her appt was at 240pm 12/7. Pt asking for a call back to confirm that is correct.

## 2022-12-06 NOTE — TELEPHONE ENCOUNTER
----- Message from Aida Fox sent at 12/6/2022  2:00 PM CST -----  Contact: [phone]  Type: Needs Medical Advice  Who Called:  Pt     Best Call Back Number: [phone]    Additional Information: Pt stated someone from the office called her today and said her appt was at 240pm 12/7. Pt asking for a call back to confirm that is correct.

## 2022-12-07 ENCOUNTER — OFFICE VISIT (OUTPATIENT)
Dept: NEPHROLOGY | Facility: CLINIC | Age: 77
End: 2022-12-07
Payer: MEDICARE

## 2022-12-07 VITALS
HEIGHT: 71 IN | DIASTOLIC BLOOD PRESSURE: 70 MMHG | WEIGHT: 205 LBS | SYSTOLIC BLOOD PRESSURE: 148 MMHG | BODY MASS INDEX: 28.7 KG/M2

## 2022-12-07 DIAGNOSIS — N18.32 ANEMIA OF CHRONIC RENAL FAILURE, STAGE 3B: ICD-10-CM

## 2022-12-07 DIAGNOSIS — E83.42 HYPOMAGNESEMIA: ICD-10-CM

## 2022-12-07 DIAGNOSIS — D63.1 ANEMIA OF CHRONIC RENAL FAILURE, STAGE 3B: ICD-10-CM

## 2022-12-07 DIAGNOSIS — N25.81 SECONDARY HYPERPARATHYROIDISM OF RENAL ORIGIN: ICD-10-CM

## 2022-12-07 DIAGNOSIS — I10 PRIMARY HYPERTENSION: ICD-10-CM

## 2022-12-07 DIAGNOSIS — N18.31 STAGE 3A CHRONIC KIDNEY DISEASE: Primary | ICD-10-CM

## 2022-12-07 PROCEDURE — 99204 PR OFFICE/OUTPT VISIT, NEW, LEVL IV, 45-59 MIN: ICD-10-PCS | Mod: S$PBB,,, | Performed by: INTERNAL MEDICINE

## 2022-12-07 PROCEDURE — 99204 OFFICE O/P NEW MOD 45 MIN: CPT | Mod: S$PBB,,, | Performed by: INTERNAL MEDICINE

## 2022-12-07 PROCEDURE — 99213 OFFICE O/P EST LOW 20 MIN: CPT | Mod: PBBFAC,PN | Performed by: INTERNAL MEDICINE

## 2022-12-07 PROCEDURE — 99999 PR PBB SHADOW E&M-EST. PATIENT-LVL III: CPT | Mod: PBBFAC,,, | Performed by: INTERNAL MEDICINE

## 2022-12-07 PROCEDURE — 99999 PR PBB SHADOW E&M-EST. PATIENT-LVL III: ICD-10-PCS | Mod: PBBFAC,,, | Performed by: INTERNAL MEDICINE

## 2022-12-07 NOTE — PROGRESS NOTES
Subjective:       Patient ID: Darby Rodriguez is a 77 y.o. Black or  female who presents for new patient evaluation for chronic renal failure.    Darby Rodriguez is referred by Anahi Drew DO to be evaluated for chronic renal failure.  She has nocturia 3-4 times a night but also states she has been more thirsty lately and is drinking more fluid right before bed.  She has no uremic or urinary symptoms and is in her usual state of health.  There have been no recent illnesses, hospitalizations or procedures.  She denies chronic NSAIDs.        Review of Systems   Constitutional:  Negative for appetite change, chills and fever.   HENT:  Negative for congestion.    Eyes:  Negative for visual disturbance.   Respiratory:  Negative for cough and shortness of breath.    Cardiovascular:  Negative for chest pain and leg swelling.   Gastrointestinal:  Negative for abdominal pain, diarrhea, nausea and vomiting.   Genitourinary:  Positive for frequency (nocturia 3-4x). Negative for difficulty urinating, dysuria and hematuria.   Musculoskeletal:  Positive for arthralgias, back pain, gait problem, myalgias and neck pain.   Skin:  Negative for rash.   Neurological:  Negative for headaches.   Psychiatric/Behavioral:  Negative for sleep disturbance.      The past medical, family and social histories were reviewed for this encounter.     Past Medical History:   Diagnosis Date    Allergy     Anemia     Anemia in chronic kidney disease     Arthritis     Atrial fibrillation     Bilateral carpal tunnel syndrome     Carpal tunnel syndrome, right 01/2020    Cervical disc disease     Chronic kidney disease (CKD), stage III (moderate)     Colon polyp     Degenerative joint disease (DJD) of hip     (L); last injected 4/1/15    Diverticulosis     DJD (degenerative joint disease) of knee     left; last injected 4/1/15    Encounter for blood transfusion     GERD (gastroesophageal reflux disease)     Gout     Headache(784.0)      "History of cystic kidney disease     HTN (hypertension)     Hypothyroidism (acquired)     IBS (irritable bowel syndrome) 7/29/2013    Lumbar disc disease     Mixed hyperlipidemia     PONV (postoperative nausea and vomiting)     Renal insufficiency     Dr. Greene    Sleep apnea     wears cPAP     Past Surgical History:   Procedure Laterality Date    CARPAL TUNNEL RELEASE Right 2/3/2020    Procedure: RELEASE, CARPAL TUNNEL;  Surgeon: SELIN Arizmendi MD;  Location: [facility];  Service: Orthopedics;  Laterality: Right;    COLONOSCOPY  2010?  [facility]    (diverticulosis?)    COLONOSCOPY  03/2015    Dr. Pickett, repeat in 5 years for surveillance    COLONOSCOPY N/A 2/26/2019    Procedure: COLONOSCOPY;  Surgeon: Leighton Pickett Jr., MD;  Location: [facility];  Service: Endoscopy;  Laterality: N/A; hemorrhoids, 1 colon polyp removed, diverticulosis, Mild colonic spasm consistent with irritable bowel syndrome; repeat in 5 years for surveillance; biopsy: Tubular adenoma    COLONOSCOPY W/ POLYPECTOMY  11/12/2010  [facility]    Two 5 mm polyps in the sigmoid colon.  Diverticulosis (sigmoid, descending, and transverse colon).  Melanosis.    COLONOSCOPY W/ POLYPECTOMY  6/17/13  [facility]    One 1 mm polyp in the distal sigmoid colon.  TUBULAR ADENOMA.  Diverticulosis in the sigmoid colon.  Moderate colonic spasm consistent with IBS.  Redundant colon.  Melanosis in the colon.    ESOPHAGOGASTRODUODENOSCOPY  4/27/2012  [facility]    Normal esophagus.  Small hiatal hernia.  Antrum erythema.  REACTIVE/CHEMICAL GASTROPATHY.  NO HELICOBACTER SEEN.    ESOPHAGOSCOPY W/ DILATION  1/5/2015  [facility]    Benign-appearing esophageal stricture or "ring," dilated 54 Fr.  Reflux esophagitis. (NERD).  Hiatus hernia.  Erythematous mucosa in the antrum.  NONNEOPLASTIC GASTRIC MUCOSA SHOWING REACTIVE/REPARATIVE CHANGES.  CECY Test  Negative.    HYSTERECTOMY  1980    Partial- one ovary remains- patient believes the right one remains    JOINT REPLACEMENT  4/15/11    (R) TKA "    JOINT REPLACEMENT  8/19/09    (R)PAVAN    KNEE ARTHROSCOPY      (R) knee    RADIOFREQUENCY ABLATION Right 10/20/2014     Social History     Socioeconomic History    Marital status:    Tobacco Use    Smoking status: Never    Smokeless tobacco: Never   Substance and Sexual Activity    Alcohol use: No    Drug use: Yes     Types: Oxycodone     Current Outpatient Medications   Medication Sig    allopurinoL (ZYLOPRIM) 300 MG tablet Take 1 tablet (300 mg total) by mouth once daily.    ascorbic acid, vitamin C, (VITAMIN C) 1000 MG tablet Take 1,000 mg by mouth once daily. HOLD AM OF SURGERY    aspirin (ECOTRIN) 81 MG EC tablet Take 81 mg by mouth once a week.    atenoloL (TENORMIN) 100 MG tablet Take 1 tablet (100 mg total) by mouth once daily.    atorvastatin (LIPITOR) 10 MG tablet Take 1 tablet (10 mg total) by mouth once daily.    calcitRIOL (ROCALTROL) 0.25 MCG Cap Take 1 capsule (0.25 mcg total) by mouth once a week. With food    cholecalciferol, vitamin D3, (VITAMIN D3) 50 mcg (2,000 unit) Cap TAKE 1 CAPSULE (2,000 UNITS TOTAL) BY MOUTH ONCE A WEEK. WEEKLY WITH FOOD    colchicine (MITIGARE) 0.6 mg Cap One bid prn gout flare up    dicyclomine (BENTYL) 20 mg tablet Take 1 tablet (20 mg total) by mouth 3 (three) times daily.    fish oil-omega-3 fatty acids 300-1,000 mg capsule Take 1 capsule by mouth once daily. HOLD AM OF SURGERY    fluticasone propionate (FLONASE) 50 mcg/actuation nasal spray 1 spray (50 mcg total) by Each Nostril route 2 (two) times a day.    folic acid/multivit-min/lutein (CENTRUM SILVER ORAL) Take 1 tablet by mouth once daily. HOLD AM OF SURGERY    furosemide (LASIX) 40 MG tablet Take 1 tablet (40 mg total) by mouth daily as needed.    GINKGO BILOBA ORAL Take 1 tablet by mouth once daily. Hold until after surgery    hydrocortisone (ANUSOL-HC) 25 mg suppository Place 1 suppository (25 mg total) rectally 2 (two) times daily as needed for Hemorrhoids. Use if needed    levothyroxine (SYNTHROID)  "88 MCG tablet Take 1 tablet (88 mcg total) by mouth before breakfast.    losartan (COZAAR) 100 MG tablet Take 1 tablet (100 mg total) by mouth every evening.    magnesium oxide (MAG-OX) 400 mg (241.3 mg magnesium) tablet Take 400 mg by mouth 2 (two) times daily.     meclizine (ANTIVERT) 25 mg tablet Take 1 tablet (25 mg total) by mouth 3 (three) times daily as needed for Dizziness.    mirtazapine (REMERON) 15 MG tablet Take 15 mg by mouth nightly as needed for Insomnia.    omeprazole (PRILOSEC) 40 MG capsule Take 1 capsule (40 mg total) by mouth once daily.    ondansetron (ZOFRAN) 4 MG tablet Take 1 tablet (4 mg total) by mouth every 8 (eight) hours as needed.    oxyCODONE-acetaminophen (PERCOCET)  mg per tablet Take 1 tablet by mouth every 12 (twelve) hours as needed. (Patient taking differently: Take 1 tablet by mouth every 12 (twelve) hours as needed for Pain.)    polyethylene glycol (PURELAX) 17 gram/dose powder TAKE 17 GRAMS BY MOUTH ONCE DAILY. MIX WITH 8 OZ OF WATER/LIQUID.    valACYclovir (VALTREX) 1000 MG tablet TAKE ONE TABLET BY MOUTH THREE TIMES A DAY    vitamin B complex (B COMPLEX-VITAMIN B12 ORAL) Take 1 capsule by mouth once daily.     No current facility-administered medications for this visit.       BP (!) 148/70 (BP Location: Left arm, Patient Position: Sitting)   Ht 5' 11" (1.803 m)   Wt 93 kg (205 lb)   BMI 28.59 kg/m²     Objective:      Physical Exam  Vitals reviewed.   Constitutional:       General: She is not in acute distress.     Appearance: She is well-developed.   HENT:      Head: Normocephalic and atraumatic.   Eyes:      General: No scleral icterus.     Conjunctiva/sclera: Conjunctivae normal.   Neck:      Vascular: No JVD.   Cardiovascular:      Rate and Rhythm: Normal rate and regular rhythm.      Heart sounds: Normal heart sounds. No murmur heard.    No friction rub. No gallop.   Pulmonary:      Effort: Pulmonary effort is normal. No respiratory distress.      Breath " sounds: Normal breath sounds. No wheezing or rales.   Abdominal:      General: Bowel sounds are normal. There is no distension.      Palpations: Abdomen is soft.      Tenderness: There is no abdominal tenderness.   Musculoskeletal:      Cervical back: Normal range of motion.      Right lower leg: No edema.      Left lower leg: No edema.   Skin:     General: Skin is warm and dry.      Findings: No rash.   Neurological:      Mental Status: She is alert and oriented to person, place, and time.   Psychiatric:         Mood and Affect: Mood normal.         Behavior: Behavior normal.       Assessment:       1. Stage 3b chronic kidney disease    2. Secondary hyperparathyroidism of renal origin    3. Primary hypertension    4. Anemia of chronic renal failure, stage 3b    5. Hypomagnesemia          Plan:   Return to clinic in 6 months.  Labs for next visit include rp pth mg.  Baseline creatinine is 1.2-1.3 since 2013.  I urged her to stop drinking fluids 1-2 hours before bed and to empty her bladder right before she goes to sleep.  Continue current medications as prescribed and reviewed.   Blood pressure is controlled on the current regimen.

## 2023-08-17 PROBLEM — I50.1 LEFT VENTRICULAR FAILURE, UNSPECIFIED: Status: ACTIVE | Noted: 2023-08-17

## 2023-08-17 PROBLEM — M46.1 SACROILIITIS, NOT ELSEWHERE CLASSIFIED: Status: ACTIVE | Noted: 2023-08-17

## 2023-08-17 PROBLEM — F33.9 DEPRESSION, RECURRENT: Status: ACTIVE | Noted: 2023-08-17

## 2023-08-17 PROBLEM — K51.80 OTHER ULCERATIVE COLITIS WITHOUT COMPLICATION: Status: ACTIVE | Noted: 2023-08-17

## 2023-08-18 PROBLEM — R63.4 WEIGHT LOSS, UNINTENTIONAL: Status: ACTIVE | Noted: 2022-07-21

## 2023-08-18 PROBLEM — K52.9 CHRONIC DIARRHEA: Status: ACTIVE | Noted: 2022-07-27

## 2023-08-18 PROBLEM — Z86.2 HISTORY OF ANEMIA: Status: ACTIVE | Noted: 2022-07-08

## 2023-08-18 PROBLEM — Z99.89 WALKER AS AMBULATION AID: Status: ACTIVE | Noted: 2022-07-27

## 2023-08-18 PROBLEM — Z78.9 UNDER CARE OF PAIN MANAGEMENT SPECIALIST: Status: ACTIVE | Noted: 2022-07-27

## 2023-08-18 PROBLEM — Z80.0 FAMILY HISTORY OF COLON CANCER IN FATHER: Status: ACTIVE | Noted: 2022-07-21

## 2024-03-15 ENCOUNTER — TELEPHONE (OUTPATIENT)
Dept: PAIN MEDICINE | Facility: CLINIC | Age: 79
End: 2024-03-15
Payer: MEDICARE

## 2024-03-15 NOTE — TELEPHONE ENCOUNTER
----- Message from Miladis Foley sent at 3/15/2024  3:53 PM CDT -----  Type: Needs Medical Advice  Who Called:  pt  Symptoms (please be specific):    How long has patient had these symptoms:    Pharmacy name and phone #:    Best Call Back Number: [phone]    Additional Information: Pt is in terrible pain.  Please call back to advise.  Thanks

## 2024-03-21 PROBLEM — M05.742: Status: ACTIVE | Noted: 2024-03-21

## 2024-03-21 PROBLEM — E11.42 TYPE 2 DIABETES MELLITUS WITH DIABETIC POLYNEUROPATHY, WITHOUT LONG-TERM CURRENT USE OF INSULIN: Status: ACTIVE | Noted: 2024-03-21

## 2024-03-21 PROBLEM — K51.80 OTHER ULCERATIVE COLITIS WITHOUT COMPLICATION: Status: RESOLVED | Noted: 2023-08-17 | Resolved: 2024-03-21

## 2024-03-21 PROBLEM — I50.1 LEFT VENTRICULAR FAILURE, UNSPECIFIED: Status: RESOLVED | Noted: 2023-08-17 | Resolved: 2024-03-21

## 2024-06-12 ENCOUNTER — HOSPITAL ENCOUNTER (OUTPATIENT)
Dept: RADIOLOGY | Facility: HOSPITAL | Age: 79
Discharge: HOME | End: 2024-06-12
Attending: ANESTHESIOLOGY
Payer: MEDICARE

## 2024-06-12 ENCOUNTER — OFFICE VISIT (OUTPATIENT)
Dept: PAIN MEDICINE | Facility: CLINIC | Age: 79
End: 2024-06-12
Payer: MEDICARE

## 2024-06-12 VITALS
HEART RATE: 66 BPM | BODY MASS INDEX: 37.39 KG/M2 | HEIGHT: 61 IN | WEIGHT: 198.06 LBS | DIASTOLIC BLOOD PRESSURE: 76 MMHG | SYSTOLIC BLOOD PRESSURE: 173 MMHG

## 2024-06-12 DIAGNOSIS — M16.12 PRIMARY OSTEOARTHRITIS OF LEFT HIP: Primary | ICD-10-CM

## 2024-06-12 DIAGNOSIS — M54.9 DORSALGIA: ICD-10-CM

## 2024-06-12 DIAGNOSIS — M16.12 PRIMARY OSTEOARTHRITIS OF LEFT HIP: ICD-10-CM

## 2024-06-12 PROCEDURE — 99204 OFFICE O/P NEW MOD 45 MIN: CPT | Mod: S$PBB,,, | Performed by: ANESTHESIOLOGY

## 2024-06-12 PROCEDURE — 99213 OFFICE O/P EST LOW 20 MIN: CPT | Mod: PBBFAC,PO | Performed by: ANESTHESIOLOGY

## 2024-06-12 PROCEDURE — 99999 PR PBB SHADOW E&M-EST. PATIENT-LVL III: CPT | Mod: PBBFAC,,, | Performed by: ANESTHESIOLOGY

## 2024-06-12 NOTE — PROGRESS NOTES
[facility] New Patient Evaluation      Referred by: Kenneth Villalba    PCP:     CC:   Chief Complaint   Patient presents with    Hip Pain     Replaced right side in 2009    Leg Pain     Left side     Knee Pain     Right knee replacement in 2011 6/12/2024    10:40 AM 6/19/2015     8:44 AM   Last 3 PDI Scores   Pain Disability Index (PDI) 40 26         HPI:   Darby Rodriguez is a 79 y.o. female patient who has a past medical history of Allergy, Anemia in chronic kidney disease, Atrial fibrillation, Bilateral carpal tunnel syndrome, Carpal tunnel syndrome, right, Cervical disc disease, Chronic kidney disease (CKD), stage III (moderate), Colon polyp, Degenerative joint disease (DJD) of hip, Diverticulosis, DJD (degenerative joint disease) of knee, Encounter for blood transfusion, GERD (gastroesophageal reflux disease), Gout, Headache(784.0), History of cystic kidney disease, HTN (hypertension), Hypothyroidism (acquired), IBS (irritable bowel syndrome), Lumbar disc disease, Mixed hyperlipidemia, PONV (postoperative nausea and vomiting), Renal insufficiency, Rheumatoid Arthritis, and Sleep apnea. She presents with back and hip pain.  She has had chronic back and hip pain that has been worsening over the past 6 months.  Today her pain is 10/10, constant, aching, deep, sharp.  Her left hip pain radiates into the left groin and down the outside of the left hip stopping at the knee.  Her pain is worse with standing, lying, bending, walking, lifting and relieved with rest.  She currently sees an outside pain management physician as being managed on oxycodone 10/325 mg 3 times a day.      Pain Intervention History:      Past Spine Surgical History:      Past and current medications:  Antineuropathics:  NSAIDs:  Physical therapy: no, currently too painful  Antidepressants:  Muscle relaxers:  Opioids: oxycodone   Antiplatelets/Anticoagulants: aspirin     History:    Current Outpatient Medications:      allopurinoL (ZYLOPRIM) 300 MG tablet, TAKE 1 TABLET BY MOUTH EVERY DAY, Disp: 90 tablet, Rfl: 3    ascorbic acid, vitamin C, (VITAMIN C) 1000 MG tablet, Take 1,000 mg by mouth once daily. HOLD AM OF SURGERY, Disp: , Rfl:     aspirin (ECOTRIN) 81 MG EC tablet, Take 81 mg by mouth once a week., Disp: , Rfl:     atenoloL (TENORMIN) 50 MG tablet, TAKE 1 TABLET BY MOUTH EVERY DAY, Disp: 90 tablet, Rfl: 3    atorvastatin (LIPITOR) 10 MG tablet, TAKE 1 TABLET BY MOUTH EVERY DAY, Disp: 90 tablet, Rfl: 3    calcitRIOL (ROCALTROL) 0.25 MCG Cap, Take 1 capsule (0.25 mcg total) by mouth every 14 (fourteen) days. With food, Disp: 6 capsule, Rfl: 3    colchicine (COLCRYS) 0.6 mg tablet, Take 1 tablet (0.6 mg total) by mouth 2 (two) times daily as needed (gout flair). Take 2 at the 1st sign of a flare then twice a day until resolved, Disp: 30 tablet, Rfl: 6    EScitalopram oxalate (LEXAPRO) 10 MG tablet, Take 1 tablet (10 mg total) by mouth once daily., Disp: 90 tablet, Rfl: 3    famotidine (PEPCID) 40 MG tablet, Take 1 tablet (40 mg total) by mouth every evening., Disp: 90 tablet, Rfl: 3    fish oil-omega-3 fatty acids 300-1,000 mg capsule, Take 1 capsule by mouth once daily. HOLD AM OF SURGERY, Disp: , Rfl:     fluticasone propionate (FLONASE) 50 mcg/actuation nasal spray, SPRAY 1 SPRAY (50 MCG TOTAL) INTO INTO EACH NOSTRIL TWICE A DAY, Disp: 48 mL, Rfl: 1    folic acid/multivit-min/lutein (CENTRUM SILVER ORAL), Take 1 tablet by mouth every other day. HOLD AM OF SURGERY, Disp: , Rfl:     furosemide (LASIX) 40 MG tablet, TAKE 1 TABLET BY MOUTH DAILY AS NEEDED, Disp: 90 tablet, Rfl: 1    GINKGO BILOBA ORAL, Take 1 tablet by mouth once daily. Hold until after surgery, Disp: , Rfl:     HYDROcodone-acetaminophen (NORCO)  mg per tablet, Take 1 tablet by mouth every 6 (six) hours as needed., Disp: , Rfl:     levothyroxine (SYNTHROID) 88 MCG tablet, Take 1 tablet (88 mcg total) by mouth before breakfast., Disp: 30 tablet, Rfl:  11    LIDOcaine (LIDODERM) 5 %, Place 1 patch onto the skin once daily. Remove & Discard patch within 12 hours or as directed by MD, Disp: 20 patch, Rfl: 0    losartan-hydrochlorothiazide 100-25 mg (HYZAAR) 100-25 mg per tablet, Take 1 tablet by mouth once daily., Disp: 90 tablet, Rfl: 3    magnesium oxide (MAG-OX) 400 mg (241.3 mg magnesium) tablet, Take 1 tablet (400 mg total) by mouth 2 (two) times daily., Disp: , Rfl:     meclizine (ANTIVERT) 25 mg tablet, TAKE 1 TABLET BY MOUTH 3 TIMES DAILY AS NEEDED FOR DIZZINESS., Disp: 60 tablet, Rfl: 2    mirtazapine (REMERON) 45 MG tablet, Take 1 tablet (45 mg total) by mouth every evening., Disp: 90 tablet, Rfl: 3    omeprazole (PRILOSEC) 40 MG capsule, TAKE 1 CAPSULE BY MOUTH EVERY DAY, Disp: 90 capsule, Rfl: 1    ondansetron (ZOFRAN) 4 MG tablet, Take 1 tablet (4 mg total) by mouth every 8 (eight) hours as needed., Disp: 25 tablet, Rfl: 5    oxyCODONE-acetaminophen (PERCOCET)  mg per tablet, Take 1 tablet by mouth every 12 (twelve) hours as needed. (Patient taking differently: Take 1 tablet by mouth every 12 (twelve) hours as needed for Pain.), Disp: 60 tablet, Rfl: 0    polyethylene glycol (PURELAX) 17 gram/dose powder, TAKE 17 GRAMS BY MOUTH ONCE DAILY. MIX WITH 8 OZ OF WATER/LIQUID., Disp: 510 g, Rfl: 2    valACYclovir (VALTREX) 1000 MG tablet, Take 1 tablet (1,000 mg total) by mouth 3 (three) times daily., Disp: 21 tablet, Rfl: 7    vitamin B complex (B COMPLEX-VITAMIN B12 ORAL), Take 1 capsule by mouth once daily., Disp: , Rfl:     loratadine (CLARITIN) 10 mg tablet, Take 1 tablet (10 mg total) by mouth once daily., Disp: 30 tablet, Rfl: 0    Past Medical History:   Diagnosis Date    Allergy     Anemia in chronic kidney disease     Atrial fibrillation     Bilateral carpal tunnel syndrome     Carpal tunnel syndrome, right 01/2020    Cervical disc disease     Chronic kidney disease (CKD), stage III (moderate)     Colon polyp     Degenerative joint disease  "(DJD) of hip     Diverticulosis     DJD (degenerative joint disease) of knee     Encounter for blood transfusion     GERD (gastroesophageal reflux disease)     Gout     Headache(784.0)     History of cystic kidney disease     HTN (hypertension)     Hypothyroidism (acquired)     IBS (irritable bowel syndrome) 07/29/2013    Lumbar disc disease     Mixed hyperlipidemia     PONV (postoperative nausea and vomiting)     Renal insufficiency     Dr. Greene    Rheumatoid Arthritis     Sleep apnea     wears cPAP       Past Surgical History:   Procedure Laterality Date    CARPAL TUNNEL RELEASE Right 02/03/2020    Procedure: RELEASE, CARPAL TUNNEL;  Surgeon: SELIN Arizmendi MD;  Location: [facility];  Service: Orthopedics;  Laterality: Right;    COLONOSCOPY  2010?  [facility]ro    (diverticulosis?)    COLONOSCOPY  03/2015    Dr. Pickett, repeat in 5 years for surveillance    COLONOSCOPY N/A 02/26/2019    Procedure: COLONOSCOPY;  Surgeon: Leighton Pickett Jr., MD;  Location: [facility];  Service: Endoscopy;  Laterality: N/A; hemorrhoids, 1 colon polyp removed, diverticulosis, Mild colonic spasm consistent with irritable bowel syndrome; repeat in 5 years for surveillance; biopsy: Tubular adenoma    COLONOSCOPY W/ POLYPECTOMY  11/12/2010  Ferro    Two 5 mm polyps in the sigmoid colon.  Diverticulosis (sigmoid, descending, and transverse colon).  Melanosis.    COLONOSCOPY W/ POLYPECTOMY  6/17/13  [facility]    One 1 mm polyp in the distal sigmoid colon.  TUBULAR ADENOMA.  Diverticulosis in the sigmoid colon.  Moderate colonic spasm consistent with IBS.  Redundant colon.  Melanosis in the colon.    ESOPHAGOGASTRODUODENOSCOPY  4/27/2012  [facility]ro    Normal esophagus.  Small hiatal hernia.  Antrum erythema.  REACTIVE/CHEMICAL GASTROPATHY.  NO HELICOBACTER SEEN.    ESOPHAGOSCOPY W/ DILATION  1/5/2015  [facility]    Benign-appearing esophageal stricture or "ring," dilated 54 Fr.  Reflux esophagitis. (NERD).  Hiatus hernia.  Erythematous mucosa in the antrum.  " "NONNEOPLASTIC GASTRIC MUCOSA SHOWING REACTIVE/REPARATIVE CHANGES.  CECY Test  Negative.    HYSTERECTOMY  1980    Partial- one ovary remains- patient believes the right one remains    JOINT REPLACEMENT  04/15/2011    (R) TKA    JOINT REPLACEMENT  08/19/2009    (R)PAVAN    KNEE ARTHROSCOPY      (R) knee    RADIOFREQUENCY ABLATION Right 10/20/2014       Family History   Problem Relation Name Age of Onset    Heart disease Sister  50        mi    Ovarian cancer Sister  53    Diabetes Mother      Colon cancer Father  65    Breast cancer Neg Hx      Crohn's disease Neg Hx      Ulcerative colitis Neg Hx         Social History     Socioeconomic History    Marital status:    Tobacco Use    Smoking status: Never    Smokeless tobacco: Never   Substance and Sexual Activity    Alcohol use: No    Drug use: Yes     Types: Oxycodone       Review of patient's allergies indicates:   Allergen Reactions    Iodine      Blurred vision on 2 occasions.    Meperidine      Other reaction(s): hyperventalating  Other reaction(s): blurred vision    Penicillins     Promethazine      Other reaction(s): blurred vision       Review of Systems:  12 point review of systems is negative.    Physical Exam:  Vitals:    06/12/24 1040   BP: (!) 173/76   Pulse: 66   Weight: 89.8 kg (198 lb 1.3 oz)   Height: 5' 1" (1.549 m)   PainSc: 10-Worst pain ever   PainLoc: Hip     Body mass index is 37.43 kg/m².    Gen: NAD  Psych: mood appropriate for given condition  HEENT: eyes anicteric   CV: RRR  HEENT: anicteric   Respiratory: non-labored, no signs of respiratory distress  Abd: non-distended  Skin: warm, dry and intact.  Gait: No antalgic gait.     Pain into the left groin with FADIR  Reproducible pain with lumbar axial facet loading    Sensory:  Intact and symmetrical to light touch in L1-S1 dermatomes bilaterally.    Motor:     Right Left   L2/3 Iliacus Hip flexion  5  4   L3/4 Qudratus Femoris Knee Extension  5  5   L4/5 Tib Anterior Ankle Dorsiflexion   5 "  5   L5/S1 Extensor Hallicus Longus Great toe extension  5  5   S1/S2 Gastroc/Soleus Plantar Flexion  5  5       Labs:  Lab Results   Component Value Date    HGBA1C 5.5 03/01/2024       Lab Results   Component Value Date    WBC 4.69 05/03/2024    HGB 10.5 (L) 05/03/2024    HCT 33.7 (L) 05/03/2024    MCV 96 05/03/2024     05/03/2024           Imaging:  MRI lumbar 6/14/18  FINDINGS:  The vertebral body heights are preserved.  There is no significant spondylolisthesis.  There is multilevel disc height loss and disc dehydration with marginal osteophytosis.  Multilevel facet arthropathy is present.  There is no non degenerative marrow edema or infiltrative marrow process identified.  Modic type 1 changes noted at several levels.  The conus medullaris terminates at T12 level.  There is no significant paraspinal soft tissue edema.     T12-L1: There is a mild circumferential disc bulge.  There is moderate facet arthropathy..  There is no significant central spinal canal narrowing identified.  There is moderate to severe right neural foraminal narrowing.  No significant left neural foraminal narrowing.  L1-L2: There is a mild circumferential disc bulge.  There is moderate facet arthropathy.  There is no significant central spinal canal narrowing identified.  There is mild right and mild left neural foraminal narrowing.  L2-L3: There is a moderate circumferential disc bulge.  There is moderate to severe facet arthropathy with ligamentum flavum hypertrophy.  There is no significant central spinal canal narrowing identified.  There is moderate bilateral neural foraminal narrowing.  L3-L4: There is a circumferential disc bulge.  There is moderate to severe facet arthropathy with ligamentum flavum hypertrophy.  There is no significant central spinal canal narrowing identified.  There is moderate bilateral neural foraminal narrowing.  L4-L5: There is circumferential disc bulge.  There is severe facet arthropathy and  ligament flavum hypertrophy.  There is no significant central spinal canal narrowing.  There is moderate left and  severe right neural foraminal narrowing.  L5-S1: There is a circumferential disc bulge.  There is severe facet arthropathy.  There is mild central spinal canal narrowing.  There is severe bilateral neural foraminal narrowing.    Xray hip 9/24/18  FINDINGS:  Total right hip arthroplasty appears in stable position without evidence of loosening.  Heterotopic ossification about the lateral right hip is unchanged.  No fracture or dislocation.    Assessment:   Problem List Items Addressed This Visit    None  Visit Diagnoses       Primary osteoarthritis of left hip    -  Primary    Relevant Orders    X-Ray Hip 2 or 3 views Left with Pelvis when performed    Dorsalgia        Relevant Orders    X-Ray Lumbar Complete Including Flex And Ext              Darby Rodriguez is a 79 y.o. female patient who has a past medical history of Allergy, Anemia in chronic kidney disease, Atrial fibrillation, Bilateral carpal tunnel syndrome, Carpal tunnel syndrome, right, Cervical disc disease, Chronic kidney disease (CKD), stage III (moderate), Colon polyp, Degenerative joint disease (DJD) of hip, Diverticulosis, DJD (degenerative joint disease) of knee, Encounter for blood transfusion, GERD (gastroesophageal reflux disease), Gout, Headache(784.0), History of cystic kidney disease, HTN (hypertension), Hypothyroidism (acquired), IBS (irritable bowel syndrome), Lumbar disc disease, Mixed hyperlipidemia, PONV (postoperative nausea and vomiting), Renal insufficiency, Rheumatoid Arthritis, and Sleep apnea. She presents with back and hip pain.  She has had chronic back and hip pain that has been worsening over the past 6 months.  Today her pain is 10/10, constant, aching, deep, sharp.  Her left hip pain radiates into the left groin and down the outside of the left hip stopping at the knee.  Her pain is worse with standing, lying,  bending, walking, lifting and relieved with rest. She currently sees an outside pain management physician as being managed on oxycodone 10/325 mg 3 times a day.    - on exam she has weakness with left hip flexion.  She has pain in the left groin with FADIR.  She has intact sensation to light touch bilateral L2-S1.  She has reproducible pain with lumbar axial facet loading  - I independently reviewed previous right hip x-ray from 2018 and she has significant joint space narrowing in her left hip joint  - I independently reviewed her lumbar MRI from 2018-she has significant degenerative disc disease throughout the lumbar spine.  She has significant multilevel bilateral facet arthropathy.  She is multilevel bilateral foraminal narrowing  - she reports her pain is currently too severe to participate in formal physical therapy  - she currently sees an outside pain management physician as being managed on oxycodone 10/325 mg 3 times a day.  - I discussed if I take over her care I would like to employ a multimodal approach.  Her pain is currently too severe for PT.  her primary pain complaint his left hip and groin pain.  I recommended a left intra-articular hip injection.  I discussed that if we can hopefully help improve her pain then I would like to try to slowly decrease the amount of pain medication she takes  - I ordered an updated hip x-ray and lumbar x-ray today in the office  - she does not want to consent for intra-articular hip injection on the left.  She would like to follow up with her outside pain physician for his opinion.      : Reviewed and consistent with medication use as prescribed.    Suhas Mosher M.D.  Interventional Pain Medicine / Anesthesiology    This note was completed with dictation software and grammatical errors may exist.

## 2024-09-26 PROBLEM — M70.62 GREATER TROCHANTERIC BURSITIS OF LEFT HIP: Status: ACTIVE | Noted: 2024-09-12

## 2024-10-03 ENCOUNTER — OUTPATIENT CASE MANAGEMENT (OUTPATIENT)
Dept: ADMINISTRATIVE | Facility: OTHER | Age: 79
End: 2024-10-03
Payer: MEDICARE

## 2024-10-08 NOTE — PROGRESS NOTES
Outpatient Care Management   - Patient Assessment    Patient: Darby Rodriguez  MRN:  [id number]  Date of Service:  10/8/2024  Completed by:  Antonella Renteria LMSW  Referral Date: 09/26/2024    Reason for Visit   Patient presents with    OPCM SW First Assessment Attempt     10/3/2024  1st attempt to complete Initial Assessment for Outpatient Care Management, left message.     OPCM SW Second Assessment Attempt     10/7/2024  2nd attempt to complete Initial Assessment for Outpatient Care Management,however patient reports not being available. Patient reports she's not feeling well. SW encouraged patient to contact [facility] if she needs medical advice. SW will attempt to contact patient on tomorrow. 10/08/2024    Social Work Assessment    Plan Of Care       Brief Summary:  received a referral from PCP for the following Low/Mod SW psychosocial needs in reference to her  with Alzheimer, patient needs knee replacement due gait instability and increase risk of falls/ Caregiver support  the patient also requests assistance with N/A. Care plan was created in collaboration with patient/caregiver input.  SW completed assessment with patient. Patient reports she and spouse reside together. Patient reports she's the caregiver to her spouse that has Alzheimer's. Patient denied any other family is involved and assisting with spouse care. Patient seeking additional resources to assist with caring for spouse. Patient reports needing a hip and knee replacement

## 2024-10-18 ENCOUNTER — OUTPATIENT CASE MANAGEMENT (OUTPATIENT)
Dept: ADMINISTRATIVE | Facility: OTHER | Age: 79
End: 2024-10-18
Payer: MEDICARE

## 2024-10-18 NOTE — PROGRESS NOTES
Outpatient Care Management   - Care Plan Follow Up    Patient: Darby Rodriguez  MRN:  [id number]  Date of Service:  10/18/2024  Completed by:  Antonella Renteria LMSW  Referral Date: 09/26/2024    No chief complaint on file.      Brief Summary: SW followed up with patient regarding resources. Patient denied she has had an opportunity to do so.     Complex Care Plan     Care plan was discussed and completed today with input from patient and/or caregiver.    Patient Instructions     No follow-ups on file.

## 2024-10-22 PROBLEM — N25.81 SECONDARY HYPERPARATHYROIDISM: Status: ACTIVE | Noted: 2024-10-22

## 2024-10-22 PROBLEM — I73.9 PAD (PERIPHERAL ARTERY DISEASE): Status: ACTIVE | Noted: 2024-10-22

## 2024-10-25 ENCOUNTER — OUTPATIENT CASE MANAGEMENT (OUTPATIENT)
Dept: ADMINISTRATIVE | Facility: OTHER | Age: 79
End: 2024-10-25
Payer: MEDICARE

## 2024-10-25 NOTE — PROGRESS NOTES
Outpatient Care Management   - Care Plan Follow Up    Patient: Darby Rodriguez  MRN:  [id number]  Date of Service:  10/25/2024  Completed by:  Antonella Renteria LMSW  Referral Date: 09/26/2024    Reason for Visit   Patient presents with    OPCM SW First Follow-up Attempt       Brief Summary: SW attempt to follow-up with patient regarding resources, however patient reports not being available. SW will attempt again on next week.     Complex Care Plan     Care plan was discussed and completed today with input from patient and/or caregiver.    Patient Instructions     No follow-ups on file.

## 2024-11-06 ENCOUNTER — OUTPATIENT CASE MANAGEMENT (OUTPATIENT)
Dept: ADMINISTRATIVE | Facility: OTHER | Age: 79
End: 2024-11-06
Payer: MEDICARE

## 2024-11-06 NOTE — PROGRESS NOTES
Outpatient Care Management   - Care Plan Follow Up    Patient: Darby Rodriguez  MRN:  [id number]  Date of Service:  11/6/2024  Completed by:  Antonella Renteria LMSW  Referral Date: 09/26/2024    Reason for Visit   Patient presents with    Case Closure       Brief Summary: SW followed up with patient regarding resources. Patient reports not having any questions and or concerns regarding resources. Patient reports seeing her PCP on today and everything is great. Patient reports her spouse is now seeing her PCP.  SW will close case     Complex Care Plan     Care plan was discussed and completed today with input from patient and/or caregiver.    Patient Instructions     No follow-ups on file.
